# Patient Record
Sex: FEMALE | Race: WHITE | NOT HISPANIC OR LATINO | Employment: OTHER | URBAN - NONMETROPOLITAN AREA
[De-identification: names, ages, dates, MRNs, and addresses within clinical notes are randomized per-mention and may not be internally consistent; named-entity substitution may affect disease eponyms.]

---

## 2017-02-08 ENCOUNTER — OFFICE VISIT (OUTPATIENT)
Dept: OTOLARYNGOLOGY | Facility: CLINIC | Age: 77
End: 2017-02-08

## 2017-02-08 VITALS
DIASTOLIC BLOOD PRESSURE: 64 MMHG | SYSTOLIC BLOOD PRESSURE: 137 MMHG | WEIGHT: 169 LBS | TEMPERATURE: 97.8 F | RESPIRATION RATE: 20 BRPM | HEIGHT: 64 IN | HEART RATE: 51 BPM | BODY MASS INDEX: 28.85 KG/M2

## 2017-02-08 DIAGNOSIS — C44.319 BASAL CELL CARCINOMA OF RIGHT CHEEK: ICD-10-CM

## 2017-02-08 DIAGNOSIS — H53.40 VISUAL FIELD DEFECTS: ICD-10-CM

## 2017-02-08 DIAGNOSIS — D03.39 MELANOMA IN SITU OF CHEEK (HCC): Primary | ICD-10-CM

## 2017-02-08 DIAGNOSIS — H02.30 PSEUDOPTOSIS, UNSPECIFIED LATERALITY: ICD-10-CM

## 2017-02-08 PROCEDURE — 99213 OFFICE O/P EST LOW 20 MIN: CPT | Performed by: OTOLARYNGOLOGY

## 2017-02-08 NOTE — PROGRESS NOTES
Skin Cancer Follow-up    Mary Cleveland presents for a cancer surveillance and skin check following excision of a pigmented lesion of the right cheek with linear closure on 11/1/16.     Subjective: Since surgery, she is doing well without complaints. Symptoms denied postoperatively include fever, chills, bleeding and drainage. The patient states the pain has ceased since surgery.     Objective:   Pathology review: Pathology was reviewed previously. 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 This is not an oriented specimen. There was also an incidental basal cell carcinoma which was completely excised.     She is also s/p upper blepharoplasty on 11/1/16. She has not had an improvement in visual fields - she is awaiting cataract surgery. She denies dry eyes, milia formation, visual change, or eye irritation.               Past Medical History   Diagnosis Date   • Brow ptosis    • Dermatochalasia    • Hypertension    • Melanoma      right cheek on face   • Pseudoptosis    • Ptosis of eyelid    • Visual field defect      Past Surgical History   Procedure Laterality Date   • Cholecystectomy     • Colon surgery       RESECTION   • Hysterectomy       TOTAL   • Blepharoplasty Bilateral 11/1/2016     Procedure: Bilateral upper blepharoplasty with excision of excessive tissue weighing eyelids down AND excisional biopsy of right cheek lesion with permanent section analysis and linear closure.;  Surgeon: Andriy Sorenson MD;  Location: [facility] OR;  Service:    • Skin lesion excision     • Blepharoplasty     • Breast biopsy     • Oophorectomy       Family History   Problem Relation Age of Onset   • Cancer Mother    • Cancer Father      Social History   Substance Use Topics   • Smoking status: Former Smoker     Years: 41.00     Quit date: 2004   • Smokeless tobacco: Never Used   • Alcohol use No     Allergies:  Sulfa antibiotics    Current Outpatient Prescriptions:   •  busPIRone  "(BUSPAR) 15 MG tablet, Take 0.5 tablets by mouth 2 (Two) Times a Day., Disp: 60 tablet, Rfl: 2  •  diphenoxylate-atropine (LOMOTIL) 2.5-0.025 MG per tablet, 4 (Four) Times a Day As Needed., Disp: , Rfl:   •  hydrochlorothiazide (MICROZIDE) 12.5 MG capsule, Take 12.5 mg by mouth Every Morning., Disp: , Rfl:   •  metoprolol tartrate (LOPRESSOR) 50 MG tablet, 1 tab daily, Disp: , Rfl:   •  traMADol (ULTRAM) 50 MG tablet, Every 6 (Six) Hours As Needed., Disp: , Rfl:     Review of Systems   Constitutional: Positive for appetite change. Negative for activity change, chills, fatigue, fever and unexpected weight change.   HENT: Negative for congestion, dental problem, facial swelling and nosebleeds.    Eyes: Negative for discharge and redness.   Skin: Negative for color change, pallor and rash.   Hematological: Negative for adenopathy. Does not bruise/bleed easily.   Psychiatric/Behavioral: The patient is nervous/anxious.           Visit Vitals   • /64   • Pulse 51   • Temp 97.8 °F (36.6 °C)   • Resp 20   • Ht 64\" (162.6 cm)   • Wt 169 lb (76.7 kg)   • BMI 29.01 kg/m2       Physical Exam   Constitutional: She is oriented to person, place, and time. She appears well-developed and well-nourished. She is cooperative. No distress.   HENT:   Head: Normocephalic and atraumatic.   Right Ear: External ear normal.   Left Ear: External ear normal.   Nose: Nose normal.   Mouth/Throat: Oropharynx is clear and moist.   Eyes: Conjunctivae, EOM and lids are normal. Right eye exhibits no discharge. Left eye exhibits no discharge. No scleral icterus.   Neck: Normal range of motion and phonation normal. Neck supple. No tracheal deviation present.   Pulmonary/Chest: Effort normal. No stridor. No respiratory distress.   Musculoskeletal: Normal range of motion. She exhibits no edema or deformity.   Lymphadenopathy:        Head (right side): No preauricular, no posterior auricular and no occipital adenopathy present.        Head (left " side): No preauricular, no posterior auricular and no occipital adenopathy present.     She has no cervical adenopathy.   Neurological: She is alert and oriented to person, place, and time. She has normal strength. No cranial nerve deficit. Coordination normal.   Skin: Skin is warm and dry. No rash noted. She is not diaphoretic. No erythema. No pallor.   Right cheek incision well healed- no evidence of recurrence.    Psychiatric: She has a normal mood and affect. Her speech is normal and behavior is normal. Judgment and thought content normal. Cognition and memory are normal.   Nursing note and vitals reviewed.      Assessment:  Mary was seen today for follow-up.    Diagnoses and all orders for this visit:    Melanoma in situ of cheek    Basal cell carcinoma of right cheek    Pseudoptosis, unspecified laterality    Visual field defects        Plan:  Discussed pathology results- discussed continued observation vs excision. Patient opted for continued observation. She is seeing Dr. Franco in 2 months. Follow-up with me in 5 months.     Protect the incisions from sunlight. Sunlight to the incisions will cause permanent pigmentation to the incision line and make the incision more noticeable. After the incision has reepithelialized, you may begin to use sunscreen with an SPF of 15 or greater    I discussed the use of  Vitamin E, Mederma, or a high-quality extra virgin olive oil to the incisions to optimize the end result. Apply topically twice daily, or as directed, to help optimize wound healing and decrease erythema.    Discussed the importance of routine skin checks with a dermatologist every 6-12 months.     Return in about 5 months (around 7/8/2017).      Andriy Sorenson MD   02/08/17  1:17 PM

## 2017-02-08 NOTE — PROGRESS NOTES
Mary Cleveland presents  She is status post bilateral upper blepharoplasty on 11/01/2016.  I also performed an excisional biopsy of a changing pigmented lesion of the right cheek.      Subjective: Following surgery, the patient has reported improvement in visual fields subjectively. The patient denies dry eyes, milia formation, visual change and eye irritation.    Pathology review: Right cheek demonstrates a melanoma in situ, 1.7 mm the closest peripheral margin.  This is not an oriented specimen.  There is also an incidental basal cell carcinoma which was completely excised.    Objective:     Physical Exam   Constitutional: She is oriented to person, place, and time. She appears well-developed and well-nourished. She is cooperative. No distress.   HENT:   Head: Normocephalic.   Right Ear: External ear normal.   Left Ear: External ear normal.   Nose: Nose normal.   Mouth/Throat: Oropharynx is clear and moist.   Eyes: Right eye exhibits no discharge. Left eye exhibits no discharge. No scleral icterus.   Healing well.  Sutures removed.  Moderate ecchymosis   Neck: Normal range of motion. Neck supple. No JVD present. No tracheal deviation present. No thyromegaly present.   Pulmonary/Chest: Effort normal. No stridor.   Musculoskeletal: Normal range of motion. She exhibits no edema or deformity.   Lymphadenopathy:        Head (right side): No preauricular, no posterior auricular and no occipital adenopathy present.        Head (left side): No preauricular, no posterior auricular and no occipital adenopathy present.     She has no cervical adenopathy.   Neurological: She is alert and oriented to person, place, and time. She has normal strength. No cranial nerve deficit. Coordination normal.   Skin: Skin is warm and dry. No rash noted. She is not diaphoretic. No erythema. No pallor.   Right cheek incision healing well.  No evidence of pigmentation around the incision.  Sutures were removed.   Psychiatric: She has a normal  mood and affect. Her speech is normal and behavior is normal. Judgment and thought content normal. Cognition and memory are normal.   Nursing note and vitals reviewed.          Assessment:   There are no diagnoses linked to this encounter.  Melanoma: [id number]: fully excised 11/1/2016    Plan:  There are no Patient Instructions on file for this visit.    Return in about 5 months (around 7/8/2017).

## 2017-05-22 ENCOUNTER — TRANSCRIBE ORDERS (OUTPATIENT)
Dept: ADMINISTRATIVE | Facility: HOSPITAL | Age: 77
End: 2017-05-22

## 2017-05-22 DIAGNOSIS — Z78.0 MENOPAUSE: Primary | ICD-10-CM

## 2017-06-07 ENCOUNTER — HOSPITAL ENCOUNTER (OUTPATIENT)
Dept: BONE DENSITY | Facility: HOSPITAL | Age: 77
Discharge: HOME | End: 2017-06-07
Attending: FAMILY MEDICINE | Admitting: FAMILY MEDICINE

## 2017-06-07 DIAGNOSIS — Z78.0 MENOPAUSE: ICD-10-CM

## 2017-06-07 PROCEDURE — 77080 DXA BONE DENSITY AXIAL: CPT

## 2017-07-05 ENCOUNTER — OFFICE VISIT (OUTPATIENT)
Dept: OTOLARYNGOLOGY | Facility: CLINIC | Age: 77
End: 2017-07-05

## 2017-07-05 VITALS
RESPIRATION RATE: 20 BRPM | DIASTOLIC BLOOD PRESSURE: 75 MMHG | HEART RATE: 63 BPM | WEIGHT: 169 LBS | SYSTOLIC BLOOD PRESSURE: 148 MMHG | HEIGHT: 64 IN | TEMPERATURE: 97.8 F | BODY MASS INDEX: 28.85 KG/M2

## 2017-07-05 DIAGNOSIS — D03.39 MELANOMA IN SITU OF CHEEK (HCC): Primary | ICD-10-CM

## 2017-07-05 DIAGNOSIS — C44.319 BASAL CELL CARCINOMA OF RIGHT CHEEK: ICD-10-CM

## 2017-07-05 DIAGNOSIS — L81.4 SOLAR LENTIGO: ICD-10-CM

## 2017-07-05 PROCEDURE — 99213 OFFICE O/P EST LOW 20 MIN: CPT | Performed by: OTOLARYNGOLOGY

## 2017-07-05 NOTE — PROGRESS NOTES
Skin Cancer Follow-up    Mary Cleveland presents for a cancer surveillance and skin check following excision of a pigmented lesion of the right cheek with linear closure on 11/1/16.     Subjective: Since surgery, she is doing well without complaints. Symptoms denied postoperatively include fever, chills, bleeding and drainage. The patient states the pain has ceased since surgery.     Objective:   Pathology review: Pathology was reviewed previously. 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 This is not an oriented specimen. There was also an incidental basal cell carcinoma which was completely excised.     Patient presents for follow-up general head and neck skin examination.  Patient has a history of basal cell carcinoma, malignant melanoma.  She has not noted recurrence.  The patient has noted a new lesion.     She also complains of a skin lesion of the right cheek. The lesion has been present for 1 month. Symptoms associated with the lesion are: pigmentation. Patient denies increasing diameter, increasing thickness, darkening color, itching, bleeding, pain, drainage, infection. Nothing makes the lesion better or worse. A biopsy has not been performed.     She is also s/p upper blepharoplasty on 11/1/16. She has had an improvement in visual fields after recent cataract surgery. She denies dry eyes, milia formation, visual change, or eye irritation.             Past Medical History:   Diagnosis Date   • Basal cell carcinoma of right cheek 2/8/2017   • Brow ptosis    • Dermatochalasia    • Hypertension    • Melanoma     right cheek on face   • Pseudoptosis    • Ptosis of eyelid    • Visual field defect      Past Surgical History:   Procedure Laterality Date   • BLEPHAROPLASTY Bilateral 11/1/2016    Procedure: Bilateral upper blepharoplasty with excision of excessive tissue weighing eyelids down AND excisional biopsy of right cheek lesion with permanent section  "analysis and linear closure.;  Surgeon: Andriy Sorenson MD;  Location: [facility] OR;  Service:    • BLEPHAROPLASTY     • BREAST BIOPSY     • CHOLECYSTECTOMY     • COLON SURGERY      RESECTION   • HYSTERECTOMY      TOTAL   • OOPHORECTOMY     • SKIN LESION EXCISION       Family History   Problem Relation Age of Onset   • Cancer Mother    • Cancer Father      Social History   Substance Use Topics   • Smoking status: Former Smoker     Years: 41.00     Quit date: 2004   • Smokeless tobacco: Never Used   • Alcohol use No     Allergies:  Sulfa antibiotics    Current Outpatient Prescriptions:   •  busPIRone (BUSPAR) 15 MG tablet, Take 0.5 tablets by mouth 2 (Two) Times a Day., Disp: 60 tablet, Rfl: 2  •  diphenoxylate-atropine (LOMOTIL) 2.5-0.025 MG per tablet, 4 (Four) Times a Day As Needed., Disp: , Rfl:   •  hydrochlorothiazide (MICROZIDE) 12.5 MG capsule, Take 12.5 mg by mouth Every Morning., Disp: , Rfl:   •  metoprolol tartrate (LOPRESSOR) 50 MG tablet, 1 tab daily, Disp: , Rfl:   •  traMADol (ULTRAM) 50 MG tablet, Every 6 (Six) Hours As Needed., Disp: , Rfl:     Review of Systems   Constitutional: Positive for appetite change. Negative for activity change, chills, fatigue, fever and unexpected weight change.   HENT: Negative for congestion, dental problem, facial swelling and nosebleeds.    Eyes: Negative for discharge and redness.   Skin: Negative for color change, pallor and rash.   Hematological: Negative for adenopathy. Does not bruise/bleed easily.   Psychiatric/Behavioral: The patient is nervous/anxious.           /75  Pulse 63  Temp 97.8 °F (36.6 °C)  Resp 20  Ht 64\" (162.6 cm)  Wt 169 lb (76.7 kg)  BMI 29.01 kg/m2    Physical Exam   Constitutional: She is oriented to person, place, and time. She appears well-developed and well-nourished. She is cooperative. No distress.   HENT:   Head: Normocephalic and atraumatic.   Right Ear: External ear normal.   Left Ear: External ear normal.   Nose: Nose " normal.   Mouth/Throat: Oropharynx is clear and moist.   Eyes: Conjunctivae, EOM and lids are normal. Right eye exhibits no discharge. Left eye exhibits no discharge. No scleral icterus.   Neck: Normal range of motion and phonation normal. Neck supple. No tracheal deviation present.   Pulmonary/Chest: Effort normal. No stridor. No respiratory distress.   Musculoskeletal: Normal range of motion. She exhibits no edema or deformity.   Lymphadenopathy:        Head (right side): No preauricular, no posterior auricular and no occipital adenopathy present.        Head (left side): No preauricular, no posterior auricular and no occipital adenopathy present.     She has no cervical adenopathy.   Neurological: She is alert and oriented to person, place, and time. She has normal strength. No cranial nerve deficit. Coordination normal.   Skin: Skin is warm and dry. No rash noted. She is not diaphoretic. No erythema. No pallor.   Incision well healed with minimal fibrosis. Right cheek with evenly hyperpigmented macule   Psychiatric: She has a normal mood and affect. Her speech is normal and behavior is normal. Judgment and thought content normal. Cognition and memory are normal.   Nursing note and vitals reviewed.              Assessment:  Mary was seen today for follow-up.    Diagnoses and all orders for this visit:    Melanoma in situ of cheek    Basal cell carcinoma of right cheek    Solar lentigo    s/p blepharoplasty    Plan:  Discussed skin lesion on right cheek- this appears to be benign. Discussed continued observation vs biopsy. Patient opted for observation. She was instructed to call or return to the office if the lesion enlarges, darkens, or changes.     Protect the incisions from sunlight. Sunlight to the incisions will cause permanent pigmentation to the incision line and make the incision more noticeable. After the incision has reepithelialized, you may begin to use sunscreen with an SPF of 15 or greater    I  discussed the use of  Vitamin E, Mederma, or a high-quality extra virgin olive oil to the incisions to optimize the end result. Apply topically twice daily, or as directed, to help optimize wound healing and decrease erythema.    Discussed the importance of routine skin checks with a dermatologist every 6-12 months. She sees Dr. Franco regularly     Return in about 6 months (around 1/5/2018), or if symptoms worsen or fail to improve, for Recheck.      Andriy Sorenson MD   07/05/17  10:33 AM

## 2017-08-01 ENCOUNTER — OFFICE VISIT (OUTPATIENT)
Dept: OTOLARYNGOLOGY | Facility: CLINIC | Age: 77
End: 2017-08-01

## 2017-08-01 VITALS
TEMPERATURE: 97.8 F | DIASTOLIC BLOOD PRESSURE: 68 MMHG | RESPIRATION RATE: 20 BRPM | HEART RATE: 65 BPM | WEIGHT: 178 LBS | SYSTOLIC BLOOD PRESSURE: 148 MMHG | BODY MASS INDEX: 30.39 KG/M2 | HEIGHT: 64 IN

## 2017-08-01 DIAGNOSIS — D03.39 MELANOMA IN SITU OF CHEEK (HCC): ICD-10-CM

## 2017-08-01 DIAGNOSIS — D48.9 NEOPLASM OF UNCERTAIN BEHAVIOR: ICD-10-CM

## 2017-08-01 DIAGNOSIS — C44.319 BASAL CELL CARCINOMA OF RIGHT CHEEK: Primary | ICD-10-CM

## 2017-08-01 PROCEDURE — 99213 OFFICE O/P EST LOW 20 MIN: CPT | Performed by: NURSE PRACTITIONER

## 2017-08-01 PROCEDURE — 88305 TISSUE EXAM BY PATHOLOGIST: CPT | Performed by: NURSE PRACTITIONER

## 2017-08-01 PROCEDURE — 11100 PR BIOPSY OF SKIN LESION: CPT | Performed by: NURSE PRACTITIONER

## 2017-08-01 NOTE — PROGRESS NOTES
Skin Cancer Follow-Up    Mary Cleveland presents for a cancer surveillance and skin check following excision of a pigmented lesion of the right cheek with linear closure on 11/1/16.     Subjective: Since surgery, she is doing well without complaints. Symptoms denied postoperatively include fever, chills, bleeding and drainage. The patient states the pain has ceased since surgery.     Objective:   Pathology review: Pathology was reviewed previously. 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 This is not an oriented specimen. There was also an incidental basal cell carcinoma which was completely excised.     Patient presents for follow-up general head and neck skin examination.  Patient has a history of basal cell carcinoma, malignant melanoma.  She has not noted recurrence.  The patient has noted a new lesion.     She also complains of a skin lesion of the right cheek. The lesion has been present for 2 months. Symptoms associated with the lesion are: darkening in color. Patient denies increasing diameter, increasing thickness, darkening color, itching, bleeding, pain, drainage, infection. Nothing makes the lesion better or worse. A biopsy has not been performed.     She is also s/p upper blepharoplasty on 11/1/16. She has had an improvement in visual fields after recent cataract surgery. She denies dry eyes, milia formation, visual change, or eye irritation.             Past Medical History:   Diagnosis Date   • Basal cell carcinoma of right cheek 2/8/2017   • Brow ptosis    • Dermatochalasia    • Hypertension    • Melanoma     right cheek on face   • Pseudoptosis    • Ptosis of eyelid    • Visual field defect      Past Surgical History:   Procedure Laterality Date   • BLEPHAROPLASTY Bilateral 11/1/2016    Procedure: Bilateral upper blepharoplasty with excision of excessive tissue weighing eyelids down AND excisional biopsy of right cheek lesion with permanent  "section analysis and linear closure.;  Surgeon: Andriy Sorenson MD;  Location: [facility] OR;  Service:    • BLEPHAROPLASTY     • BREAST BIOPSY     • CHOLECYSTECTOMY     • COLON SURGERY      RESECTION   • HYSTERECTOMY      TOTAL   • OOPHORECTOMY     • SKIN LESION EXCISION       Family History   Problem Relation Age of Onset   • Cancer Mother    • Cancer Father      Social History   Substance Use Topics   • Smoking status: Former Smoker     Years: 41.00     Quit date: 2004   • Smokeless tobacco: Never Used   • Alcohol use No     Allergies:  Sulfa antibiotics    Current Outpatient Prescriptions:   •  busPIRone (BUSPAR) 15 MG tablet, Take 0.5 tablets by mouth 2 (Two) Times a Day., Disp: 60 tablet, Rfl: 2  •  diphenoxylate-atropine (LOMOTIL) 2.5-0.025 MG per tablet, 4 (Four) Times a Day As Needed., Disp: , Rfl:   •  hydrochlorothiazide (MICROZIDE) 12.5 MG capsule, Take 12.5 mg by mouth Every Morning., Disp: , Rfl:   •  metoprolol tartrate (LOPRESSOR) 50 MG tablet, 1 tab daily, Disp: , Rfl:   •  traMADol (ULTRAM) 50 MG tablet, Every 6 (Six) Hours As Needed., Disp: , Rfl:     Review of Systems   Constitutional: Negative for activity change, chills, fatigue, fever and unexpected weight change.   HENT: Negative for congestion, dental problem, facial swelling and nosebleeds.    Eyes: Negative for discharge and redness.   Skin: Negative for color change, pallor and rash.   Hematological: Negative for adenopathy. Does not bruise/bleed easily.   Psychiatric/Behavioral: The patient is nervous/anxious.           /68  Pulse 65  Temp 97.8 °F (36.6 °C)  Resp 20  Ht 64\" (162.6 cm)  Wt 178 lb (80.7 kg)  BMI 30.55 kg/m2    Physical Exam   Constitutional: She is oriented to person, place, and time. She appears well-developed and well-nourished. She is cooperative. No distress.   HENT:   Head: Normocephalic and atraumatic.   Right Ear: External ear normal.   Left Ear: External ear normal.   Nose: Nose normal.   Mouth/Throat: " Oropharynx is clear and moist.   Eyes: Conjunctivae, EOM and lids are normal. Right eye exhibits no discharge. Left eye exhibits no discharge. No scleral icterus.   Neck: Normal range of motion and phonation normal. Neck supple. No tracheal deviation present.   Pulmonary/Chest: Effort normal. No stridor. No respiratory distress.   Musculoskeletal: Normal range of motion. She exhibits no deformity.   Lymphadenopathy:        Head (right side): No preauricular, no posterior auricular and no occipital adenopathy present.        Head (left side): No preauricular, no posterior auricular and no occipital adenopathy present.     She has no cervical adenopathy.   Neurological: She is alert and oriented to person, place, and time. She has normal strength. No cranial nerve deficit. Coordination normal.   Skin: Skin is warm and dry. No rash noted. She is not diaphoretic. No erythema. No pallor.   Incision well healed with minimal fibrosis. Right cheek with evenly hyperpigmented macule   Psychiatric: She has a normal mood and affect. Her speech is normal and behavior is normal. Judgment and thought content normal. Cognition and memory are normal.   Nursing note and vitals reviewed.            Assessment:  Mary was seen today for skin lesion.    Diagnoses and all orders for this visit:    Basal cell carcinoma of right cheek    Melanoma in situ of cheek    Neoplasm of uncertain behavior  -     Tissue Pathology Exam      S/p blepharoplasty    Plan:  Discussed skin lesion on right cheek. Discussed continued observation vs biopsy. Patient requests biopsy today because she thinks this area is darkening. She remains very anxious about this. 2mm punch biopsy today-see procedure note. Follow-up in 1-2 weeks to discuss pathology results.     Protect the incisions from sunlight. Sunlight to the incisions will cause permanent pigmentation to the incision line and make the incision more noticeable. After the incision has reepithelialized,  you may begin to use sunscreen with an SPF of 15 or greater    I discussed the use of  Vitamin E, Mederma, or a high-quality extra virgin olive oil to the incisions to optimize the end result. Apply topically twice daily, or as directed, to help optimize wound healing and decrease erythema.    Discussed the importance of routine skin checks with a dermatologist every 6-12 months. She sees Dr. Franco regularly     Return in about 2 weeks (around 8/15/2017), or if symptoms worsen or fail to improve, for Recheck.      Lucero Love, NEHA   08/01/17  2:36 PM

## 2017-08-01 NOTE — PROGRESS NOTES
Procedure   Procedures    Preprocedure diagnosis: A changing lesion of the right cheek    Post procedure diagnosis: Same    Procedure: Punch biopsy    Details:  Patient consent was obtained.  The skin was cleansed with alcohol.  The skin was infiltrated with 1 mL of 1% lidocaine with 1-100,000 epinephrine.  After approximately 10 minutes, a 2 millimeter punch biopsy was taken by placing circular motion on the biopsy tool, the skin was elevated and clipped with iris scissors.  The specimen was sent in formalin.  The skin was closed using a simple 5-0 fast absorbing gut suture.  Antibiotic ointment was placed over the biopsy site.  The patient tolerated the procedure with minimal discomfort.    Lucero Love, NEHA  08/01/17  2:36 PM

## 2017-08-03 LAB
CYTO UR: NORMAL
LAB AP CASE REPORT: NORMAL
Lab: NORMAL
PATH REPORT.FINAL DX SPEC: NORMAL
PATH REPORT.GROSS SPEC: NORMAL

## 2017-08-16 ENCOUNTER — OFFICE VISIT (OUTPATIENT)
Dept: OTOLARYNGOLOGY | Facility: CLINIC | Age: 77
End: 2017-08-16

## 2017-08-16 ENCOUNTER — TELEPHONE (OUTPATIENT)
Dept: OTOLARYNGOLOGY | Facility: CLINIC | Age: 77
End: 2017-08-16

## 2017-08-16 VITALS
HEIGHT: 64 IN | BODY MASS INDEX: 30.39 KG/M2 | HEART RATE: 67 BPM | RESPIRATION RATE: 20 BRPM | SYSTOLIC BLOOD PRESSURE: 138 MMHG | TEMPERATURE: 98 F | DIASTOLIC BLOOD PRESSURE: 79 MMHG | WEIGHT: 178 LBS

## 2017-08-16 DIAGNOSIS — D03.39 MELANOMA IN SITU OF CHEEK (HCC): ICD-10-CM

## 2017-08-16 DIAGNOSIS — C44.319 BASAL CELL CARCINOMA OF RIGHT CHEEK: Primary | ICD-10-CM

## 2017-08-16 DIAGNOSIS — L81.4 SOLAR LENTIGO: ICD-10-CM

## 2017-08-16 PROCEDURE — 99213 OFFICE O/P EST LOW 20 MIN: CPT | Performed by: OTOLARYNGOLOGY

## 2017-08-16 NOTE — PROGRESS NOTES
Skin Biopsy Follow-Up    Mrs. Cleveland is here to follow up from complaints of a skin lesion of the right cheek. The lesion has been present for 2 months. Symptoms associated with the lesion are: darkening in color. Patient denies increasing diameter, increasing thickness, darkening color, itching, bleeding, pain, drainage, infection. Nothing makes the lesion better or worse. A biopsy has been performed revealing solar elastosis with increased basilar layer pigmentation consistent with benign solar lentigo.      She also presents for follow-up general head and neck skin examination.  Patient has a history of basal cell carcinoma, malignant melanoma.  She has not noted recurrence.  The patient has not noted a new lesion. 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 This is not an oriented specimen. There was also an incidental basal cell carcinoma which was completely excised.     She is also s/p upper blepharoplasty on 11/1/16. She has had an improvement in visual fields after recent cataract surgery. She denies dry eyes, milia formation, visual change, or eye irritation.           Past Medical History:   Diagnosis Date   • Basal cell carcinoma of right cheek 2/8/2017   • Brow ptosis    • Dermatochalasia    • Hypertension    • Melanoma     right cheek on face   • Pseudoptosis    • Ptosis of eyelid    • Solar lentigo     Right Cheek   • Visual field defect      Past Surgical History:   Procedure Laterality Date   • BLEPHAROPLASTY Bilateral 11/1/2016    Procedure: Bilateral upper blepharoplasty with excision of excessive tissue weighing eyelids down AND excisional biopsy of right cheek lesion with permanent section analysis and linear closure.;  Surgeon: Andriy Sorenson MD;  Location: [facility] OR;  Service:    • BLEPHAROPLASTY     • BREAST BIOPSY     •  "CHOLECYSTECTOMY     • COLON SURGERY      RESECTION   • HYSTERECTOMY      TOTAL   • OOPHORECTOMY     • SKIN LESION EXCISION       Family History   Problem Relation Age of Onset   • Cancer Mother    • Cancer Father      Social History   Substance Use Topics   • Smoking status: Former Smoker     Years: 41.00     Quit date: 2004   • Smokeless tobacco: Never Used   • Alcohol use No     Allergies:  Sulfa antibiotics    Current Outpatient Prescriptions:   •  busPIRone (BUSPAR) 15 MG tablet, Take 0.5 tablets by mouth 2 (Two) Times a Day., Disp: 60 tablet, Rfl: 2  •  diphenoxylate-atropine (LOMOTIL) 2.5-0.025 MG per tablet, 4 (Four) Times a Day As Needed., Disp: , Rfl:   •  hydrochlorothiazide (MICROZIDE) 12.5 MG capsule, Take 12.5 mg by mouth Every Morning., Disp: , Rfl:   •  metoprolol tartrate (LOPRESSOR) 50 MG tablet, 1 tab daily, Disp: , Rfl:   •  traMADol (ULTRAM) 50 MG tablet, Every 6 (Six) Hours As Needed., Disp: , Rfl:     Review of Systems   Constitutional: Negative for activity change, chills, fatigue, fever and unexpected weight change.   HENT: Negative for congestion, dental problem, facial swelling and nosebleeds.    Eyes: Negative for discharge and redness.   Skin: Negative for color change, pallor and rash.   Hematological: Negative for adenopathy. Does not bruise/bleed easily.   Psychiatric/Behavioral: The patient is nervous/anxious.           /79  Pulse 67  Temp 98 °F (36.7 °C)  Resp 20  Ht 64\" (162.6 cm)  Wt 178 lb (80.7 kg)  BMI 30.55 kg/m2    Physical Exam   Constitutional: She is oriented to person, place, and time. She appears well-developed and well-nourished. She is cooperative. No distress.   HENT:   Head: Normocephalic and atraumatic.   Right Ear: External ear normal.   Left Ear: External ear normal.   Nose: Nose normal. No nasal deformity.   Mouth/Throat: Oropharynx is clear and moist.   Eyes: Conjunctivae, EOM and lids are normal. Right eye exhibits no discharge. Left eye exhibits no " discharge. No scleral icterus.   Neck: Normal range of motion and phonation normal. Neck supple. No tracheal deviation present.   Pulmonary/Chest: Effort normal. No stridor. No respiratory distress.   Musculoskeletal: Normal range of motion. She exhibits no deformity.   Lymphadenopathy:        Head (right side): No preauricular, no posterior auricular and no occipital adenopathy present.        Head (left side): No preauricular, no posterior auricular and no occipital adenopathy present.     She has no cervical adenopathy.   Neurological: She is alert and oriented to person, place, and time. She has normal strength. No cranial nerve deficit. Coordination normal.   Skin: Skin is warm and dry. No rash noted. She is not diaphoretic. No erythema. No pallor.   Incision and biopsy site are well healed without evidence of recurrence   Psychiatric: She has a normal mood and affect. Her speech is normal and behavior is normal. Judgment and thought content normal. Cognition and memory are normal.   Nursing note and vitals reviewed.            Assessment:  Mary was seen today for follow-up.    Diagnoses and all orders for this visit:    Basal cell carcinoma of right cheek    Melanoma in situ of cheek    Solar lentigo    S/p blepharoplasty    Plan:  Discussed pathology results- benign. No evidence of recurrence.     Protect the incisions from sunlight. Sunlight to the incisions will cause permanent pigmentation to the incision line and make the incision more noticeable. After the incision has reepithelialized, you may begin to use sunscreen with an SPF of 15 or greater    I discussed the use of  Vitamin E, Mederma, or a high-quality extra virgin olive oil to the incisions to optimize the end result. Apply topically twice daily, or as directed, to help optimize wound healing and decrease erythema.    Discussed the importance of routine skin checks with a dermatologist every 6-12 months. She sees Dr. Franco in May so I will see  her in January.     Return in about 5 months (around 1/16/2018) for in January.      Andriy Sorensno MD   08/16/17  1:45 PM

## 2017-08-16 NOTE — TELEPHONE ENCOUNTER
"As patient was leaving office today-she stopped at  and said she needed to take 3 medications off her med list- States \"she forgot to tell the nurse\". States she is no longer taking Tramadol, Buspar, or Lomotil  "

## 2017-10-27 ENCOUNTER — TELEPHONE (OUTPATIENT)
Dept: OBSTETRICS AND GYNECOLOGY | Facility: CLINIC | Age: 77
End: 2017-10-27

## 2017-10-27 DIAGNOSIS — Z12.31 VISIT FOR SCREENING MAMMOGRAM: Primary | ICD-10-CM

## 2018-01-08 ENCOUNTER — OFFICE VISIT (OUTPATIENT)
Dept: OTOLARYNGOLOGY | Facility: CLINIC | Age: 78
End: 2018-01-08

## 2018-01-08 VITALS
DIASTOLIC BLOOD PRESSURE: 78 MMHG | TEMPERATURE: 97.8 F | SYSTOLIC BLOOD PRESSURE: 132 MMHG | RESPIRATION RATE: 20 BRPM | HEART RATE: 80 BPM | BODY MASS INDEX: 28 KG/M2 | WEIGHT: 164 LBS | HEIGHT: 64 IN

## 2018-01-08 DIAGNOSIS — H02.30 PSEUDOPTOSIS, UNSPECIFIED LATERALITY: ICD-10-CM

## 2018-01-08 DIAGNOSIS — L81.4 SOLAR LENTIGO: ICD-10-CM

## 2018-01-08 DIAGNOSIS — C44.319 BASAL CELL CARCINOMA OF RIGHT CHEEK: Primary | ICD-10-CM

## 2018-01-08 DIAGNOSIS — D03.39 MELANOMA IN SITU OF CHEEK (HCC): ICD-10-CM

## 2018-01-08 PROCEDURE — 99213 OFFICE O/P EST LOW 20 MIN: CPT | Performed by: OTOLARYNGOLOGY

## 2018-01-08 NOTE — PROGRESS NOTES
Melanoma Follow-Up    Mrs. Cleveland presents for follow-up general head and neck skin examination.  Patient has a history of basal cell carcinoma and a malignant melanoma.  She has not noted recurrence.  The patient has noted two very small, asymptomatic lesion of the right crows foot area.  These are not changing.  They have been present for months to years.  No bleeding.  No lymph node enlargement.    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 This was not an oriented specimen. There was also an incidental basal cell carcinoma which was completely excised.     She is also s/p upper blepharoplasty on 11/1/16. She has had an improvement in visual fields after recent cataract surgery. She denies dry eyes, milia formation, visual change, or eye irritation.           Past Medical History:   Diagnosis Date   • Basal cell carcinoma of right cheek 2/8/2017   • Brow ptosis    • Dermatochalasia    • Hypertension    • Melanoma in situ of cheek     Right Cheek   • Pseudoptosis    • Ptosis of eyelid    • Solar lentigo     Right Cheek   • Visual field defect      Past Surgical History:   Procedure Laterality Date   • BLEPHAROPLASTY Bilateral 11/1/2016    Procedure: Bilateral upper blepharoplasty with excision of excessive tissue weighing eyelids down AND excisional biopsy of right cheek lesion with permanent section analysis and linear closure.;  Surgeon: Andriy Sorenson MD;  Location: [facility] OR;  Service:    • BLEPHAROPLASTY     • BREAST BIOPSY     • CHOLECYSTECTOMY     • COLON SURGERY      RESECTION   • HYSTERECTOMY      TOTAL   • OOPHORECTOMY     • SKIN LESION EXCISION       Family History   Problem Relation Age of Onset   • Cancer Mother    • Cancer Father      Social History   Substance Use Topics   • Smoking status: Former Smoker     Years: 41.00     Quit date:  "2004   • Smokeless tobacco: Never Used   • Alcohol use No     Allergies:  Sulfa antibiotics    Current Outpatient Prescriptions:   •  hydrochlorothiazide (MICROZIDE) 12.5 MG capsule, Take 12.5 mg by mouth Every Morning., Disp: , Rfl:   •  metoprolol tartrate (LOPRESSOR) 50 MG tablet, 1 tab daily, Disp: , Rfl:     Review of Systems   Constitutional: Negative for activity change, chills, fatigue, fever and unexpected weight change.   HENT: Negative for congestion, dental problem, facial swelling and nosebleeds.    Eyes: Negative for discharge and redness.   Skin: Negative for color change, pallor and rash.   Hematological: Negative for adenopathy. Does not bruise/bleed easily.   Psychiatric/Behavioral: The patient is nervous/anxious.           /78  Pulse 80  Temp 97.8 °F (36.6 °C)  Resp 20  Ht 162.6 cm (64\")  Wt 74.4 kg (164 lb)  BMI 28.15 kg/m2    Physical Exam   Constitutional: She is oriented to person, place, and time. She appears well-developed and well-nourished. She is cooperative. No distress.   HENT:   Head: Normocephalic and atraumatic.   Right Ear: External ear normal.   Left Ear: External ear normal.   Nose: Nose normal. No nasal deformity.   Mouth/Throat: Oropharynx is clear and moist.   Eyes: Conjunctivae, EOM and lids are normal. Right eye exhibits no discharge. Left eye exhibits no discharge. No scleral icterus.   Neck: Normal range of motion and phonation normal. Neck supple. No tracheal deviation present.   Pulmonary/Chest: Effort normal. No stridor. No respiratory distress.   Musculoskeletal: Normal range of motion. She exhibits no deformity.   Lymphadenopathy:        Head (right side): No preauricular, no posterior auricular and no occipital adenopathy present.        Head (left side): No preauricular, no posterior auricular and no occipital adenopathy present.     She has no cervical adenopathy.   Neurological: She is alert and oriented to person, place, and time. She has normal " strength. No cranial nerve deficit. Coordination normal.   Skin: Skin is warm and dry. No rash noted. She is not diaphoretic. No erythema. No pallor.   Incision and biopsy site are well healed without evidence of recurrence   Psychiatric: She has a normal mood and affect. Her speech is normal and behavior is normal. Judgment and thought content normal. Cognition and memory are normal.   Nursing note and vitals reviewed.    Assessment:  Mary was seen today for follow-up.    Diagnoses and all orders for this visit:    Basal cell carcinoma of right cheek    Melanoma in situ of cheek    Solar lentigo    Pseudoptosis, unspecified laterality       S/p blepharoplasty    Plan:    No evidence of recurrent disease today.  Staging is [id number] s/p complete excision 11/1/2016     Protect the incisions from sunlight. Sunlight to the incisions will cause permanent pigmentation to the incision line and make the incision more noticeable. After the incision has reepithelialized, you may begin to use sunscreen with an SPF of 15 or greater    I discussed the use of  Vitamin E, Mederma, or a high-quality extra virgin olive oil to the incisions to optimize the end result. Apply topically twice daily, or as directed, to help optimize wound healing and decrease erythema.    Discussed the importance of routine skin checks with a dermatologist every 6-12 months. F/U with me in 6 months.  If no evidence of recurrent disease, may release back to Dr. Franco for skin checks.    Return in about 6 months (around 7/8/2018).      Andriy Sorenson MD   01/08/18  12:05 PM

## 2018-05-15 ENCOUNTER — HOSPITAL ENCOUNTER (OUTPATIENT)
Dept: GENERAL RADIOLOGY | Age: 78
Discharge: HOME | End: 2018-05-15
Payer: MEDICARE

## 2018-05-15 DIAGNOSIS — M54.2 CERVICALGIA: ICD-10-CM

## 2018-05-15 PROCEDURE — 72040 X-RAY EXAM NECK SPINE 2-3 VW: CPT

## 2018-08-13 ENCOUNTER — OFFICE VISIT (OUTPATIENT)
Dept: OTOLARYNGOLOGY | Facility: CLINIC | Age: 78
End: 2018-08-13

## 2018-08-13 VITALS
HEART RATE: 65 BPM | WEIGHT: 170 LBS | TEMPERATURE: 98 F | HEIGHT: 64 IN | SYSTOLIC BLOOD PRESSURE: 134 MMHG | RESPIRATION RATE: 20 BRPM | BODY MASS INDEX: 29.02 KG/M2 | DIASTOLIC BLOOD PRESSURE: 70 MMHG

## 2018-08-13 DIAGNOSIS — D03.39 MELANOMA IN SITU OF CHEEK (HCC): ICD-10-CM

## 2018-08-13 DIAGNOSIS — C44.319 BASAL CELL CARCINOMA OF RIGHT CHEEK: Primary | ICD-10-CM

## 2018-08-13 DIAGNOSIS — D48.9 NEOPLASM OF UNCERTAIN BEHAVIOR: ICD-10-CM

## 2018-08-13 DIAGNOSIS — L81.4 SOLAR LENTIGO: ICD-10-CM

## 2018-08-13 PROCEDURE — 88341 IMHCHEM/IMCYTCHM EA ADD ANTB: CPT | Performed by: OTOLARYNGOLOGY

## 2018-08-13 PROCEDURE — 88305 TISSUE EXAM BY PATHOLOGIST: CPT | Performed by: OTOLARYNGOLOGY

## 2018-08-13 PROCEDURE — 99213 OFFICE O/P EST LOW 20 MIN: CPT | Performed by: OTOLARYNGOLOGY

## 2018-08-13 PROCEDURE — 11100 PR BIOPSY OF SKIN LESION: CPT | Performed by: OTOLARYNGOLOGY

## 2018-08-13 PROCEDURE — 88342 IMHCHEM/IMCYTCHM 1ST ANTB: CPT | Performed by: OTOLARYNGOLOGY

## 2018-08-13 NOTE — PROGRESS NOTES
Chief Complaint   Patient presents with   • Follow-up     1) BCC/ MELANOMA  2) New lesion on right cheek       Melanoma Follow-Up    Mrs. Cleveland presents for follow-up general head and neck skin examination.  Patient has a history of basal cell carcinoma and a malignant melanoma.  She has not noted recurrence.  She has noted some darkening of the skin of the right supero-lateral cheek.  This started sometime after May.  No bleeding.  No biopsy has been performed.  She sees Dr. Franco.  She did not have this lesion when she saw him in May.      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 This was not an oriented specimen. There was also an incidental basal cell carcinoma which was completely excised. She is also s/p upper blepharoplasty on 11/1/16. She has had an improvement in visual fields after recent cataract surgery. She denies dry eyes, milia formation, visual change, or eye irritation.           Past Medical History:   Diagnosis Date   • Basal cell carcinoma of right cheek 2/8/2017   • Brow ptosis    • Dermatochalasia    • Hypertension    • Melanoma in situ of cheek (CMS/HCC)     Right Cheek   • Pseudoptosis    • Ptosis of eyelid    • Solar lentigo     Right Cheek   • Visual field defect      Past Surgical History:   Procedure Laterality Date   • BLEPHAROPLASTY Bilateral 11/1/2016    Procedure: Bilateral upper blepharoplasty with excision of excessive tissue weighing eyelids down AND excisional biopsy of right cheek lesion with permanent section analysis and linear closure.;  Surgeon: Andriy Sorenson MD;  Location: [facility] OR;  Service:    • BLEPHAROPLASTY     • BREAST BIOPSY     • CHOLECYSTECTOMY     • COLON SURGERY      RESECTION   • HYSTERECTOMY      TOTAL   • OOPHORECTOMY     • SKIN LESION EXCISION       Family History   Problem Relation Age of  "Onset   • Cancer Mother    • Cancer Father      Social History   Substance Use Topics   • Smoking status: Former Smoker     Years: 41.00     Quit date: 2004   • Smokeless tobacco: Never Used   • Alcohol use No     Allergies:  Sulfa antibiotics    Current Outpatient Prescriptions:   •  hydrochlorothiazide (MICROZIDE) 12.5 MG capsule, Take 12.5 mg by mouth Every Morning., Disp: , Rfl:   •  metoprolol tartrate (LOPRESSOR) 50 MG tablet, 1 tab daily, Disp: , Rfl:     Review of Systems   Constitutional: Negative for activity change, chills, fatigue, fever and unexpected weight change.   HENT: Negative for congestion, dental problem, facial swelling and nosebleeds.    Eyes: Negative for discharge and redness.   Musculoskeletal: Negative for neck pain.   Skin: Positive for color change. Negative for pallor and rash.   Hematological: Negative for adenopathy. Does not bruise/bleed easily.   Psychiatric/Behavioral: The patient is nervous/anxious.           /70   Pulse 65   Temp 98 °F (36.7 °C)   Resp 20   Ht 162.6 cm (64\")   Wt 77.1 kg (170 lb)   BMI 29.18 kg/m²     Physical Exam   Constitutional: She is oriented to person, place, and time. She appears well-developed and well-nourished. She is cooperative. No distress.   HENT:   Head: Normocephalic and atraumatic.       Right Ear: External ear normal.   Left Ear: External ear normal.   Nose: Nose normal. No nasal deformity.   Mouth/Throat: Oropharynx is clear and moist.   Eyes: Conjunctivae, EOM and lids are normal. Right eye exhibits no discharge. Left eye exhibits no discharge. No scleral icterus.   Neck: Normal range of motion and phonation normal. Neck supple. No tracheal deviation present.   Pulmonary/Chest: Effort normal. No stridor. No respiratory distress.   Musculoskeletal: Normal range of motion. She exhibits no deformity.   Lymphadenopathy:        Head (right side): No preauricular, no posterior auricular and no occipital adenopathy present.        Head " (left side): No preauricular, no posterior auricular and no occipital adenopathy present.     She has no cervical adenopathy.   Neurological: She is alert and oriented to person, place, and time. She has normal strength. No cranial nerve deficit. Coordination normal.   Skin: Skin is warm and dry. No rash noted. She is not diaphoretic. No erythema. No pallor.   Incision and biopsy site are well healed without evidence of recurrence   Psychiatric: She has a normal mood and affect. Her speech is normal and behavior is normal. Judgment and thought content normal. Cognition and memory are normal.   Nursing note and vitals reviewed.    Melanoma in situ prior to excision:      Today:  Marked spot is the area she notices with the most color change.          Assessment:  Mary was seen today for follow-up.    Diagnoses and all orders for this visit:    Basal cell carcinoma of right cheek    Melanoma in situ of cheek (CMS/HCC)    Solar lentigo    Neoplasm of uncertain behavior    BMI 29.0-29.9,adult       S/p blepharoplasty    Plan:  Staging is [id number] s/p complete excision 11/1/2016.  There is a new lesion today that appears to be a benign solar lentigo.  She is concerned about this and would prefer a biopsy.  This was performed today.  I will call her with pathology and plan.  If benign, F/U 6 months.  If melanoma in situ, will likely refer her to Dr. Alvarez for Slow Mohs.     Protect the incisions from sunlight. Sunlight to the incisions will cause permanent pigmentation to the incision line and make the incision more noticeable. After the incision has reepithelialized, you may begin to use sunscreen with an SPF of 15 or greater    I discussed the use of  Vitamin E, Mederma, or a high-quality extra virgin olive oil to the incisions to optimize the end result. Apply topically twice daily, or as directed, to help optimize wound healing and decrease erythema.    Discussed the importance of routine skin checks with a  dermatologist every 6-12 months. I discussed that she could see Dr. Franco for routine checks and me PRN.  She would like to keep seeing both of us.  F/U with me in 6 months.  If no evidence of recurrent disease, may release back to Dr. Franco for skin checks.    Return in about 6 months (around 2/13/2019).    QUALITY MEASURES    Body Mass Index Screening and Follow-Up Plan  Body mass index is 29.18 kg/m².  Patient's Body mass index is 29.18 kg/m². BMI is above normal parameters. Recommendations include: educational material.    Tobacco Use: Screening and Cessation Intervention  Smoking status: Former Smoker                                                              Packs/day: 0.00      Years: 41.00        Quit date: 2004  Smokeless tobacco: Never Used                              nAdriy Sorenson MD   08/13/18  1:17 PM

## 2018-08-13 NOTE — PROGRESS NOTES
Preprocedure diagnosis: Lentigo versus melanoma in situ of the right cheek    Post procedure diagnosis: Same    Procedure: Punch biopsy    Details:  Patient consent was obtained.  The skin was cleansed with alcohol.  The skin was infiltrated with 1 mL of 1% lidocaine with 1-100,000 epinephrine.  After approximately 10 minutes, a 3 millimeter punch biopsy was taken by placing circular motion on the biopsy tool, the skin was elevated and clipped with iris scissors.  The specimen was sent in formalin.  The skin was closed using a simple 5-0 fast absorbing gut suture.  A Band-aid was placed over the biopsy site.  The patient tolerated the procedure with minimal discomfort.    Andriy Sorenson MD  08/13/18  1:19 PM

## 2018-08-13 NOTE — PATIENT INSTRUCTIONS
##### TOBACCO CESSATION #####   MyPlate from [employer]  The general, healthful diet is based on the 2010 Dietary Guidelines for Americans. The amount of food you need to eat from each food group depends on your age, sex, and level of physical activity and can be individualized by a dietitian. Go to ChooseMyPlate.gov for more information.  What do I need to know about the MyPlate plan?  · Enjoy your food, but eat less.  · Avoid oversized portions.  ? ½ of your plate should include fruits and vegetables.  ? ¼ of your plate should be grains.  ? ¼ of your plate should be protein.  Grains  · Make at least half of your grains whole grains.  · For a 2,000 calorie daily food plan, eat 6 oz every day.  · 1 oz is about 1 slice bread, 1 cup cereal, or ½ cup cooked rice, cereal, or pasta.  Vegetables  · Make half your plate fruits and vegetables.  · For a 2,000 calorie daily food plan, eat 2½ cups every day.  · 1 cup is about 1 cup raw or cooked vegetables or vegetable juice or 2 cups raw leafy greens.  Fruits  · Make half your plate fruits and vegetables.  · For a 2,000 calorie daily food plan, eat 2 cups every day.  · 1 cup is about 1 cup fruit or 100% fruit juice or ½ cup dried fruit.  Protein  · For a 2,000 calorie daily food plan, eat 5½ oz every day.  · 1 oz is about 1 oz meat, poultry, or fish, ¼ cup cooked beans, 1 egg, 1 Tbsp peanut butter, or ½ oz nuts or seeds.  Dairy  · Switch to fat-free or low-fat (1%) milk.  · For a 2,000 calorie daily food plan, eat 3 cups every day.  · 1 cup is about 1 cup milk or yogurt or soy milk (soy beverage), 1½ oz natural cheese, or 2 oz processed cheese.  Fats, Oils, and Empty Calories  · Only small amounts of oils are recommended.  · Empty calories are calories from solid fats or added sugars.  · Compare sodium in foods like soup, bread, and frozen meals. Choose the foods with lower numbers.  · Drink water instead of sugary drinks.  What foods can I eat?  Grains  Whole grains such as whole  wheat, quinoa, millet, and bulgur. Bread, rolls, and pasta made from whole grains. Brown or wild rice. Hot or cold cereals made from whole grains and without added sugar.  Vegetables  All fresh vegetables, especially fresh red, dark green, or orange vegetables. Peas and beans. Low-sodium frozen or canned vegetables prepared without added salt. Low-sodium vegetable juices.  Fruits  All fresh, frozen, and dried fruits. Canned fruit packed in water or fruit juice without added sugar. Fruit juices without added sugar.  Meats and Other Protein Sources  Boiled, baked, or grilled lean meat trimmed of fat. Skinless poultry. Fresh seafood and shellfish. Canned seafood packed in water. Unsalted nuts and unsalted nut butters. Tofu. Dried beans and pea. Eggs.  Dairy  Low-fat or fat-free milk, yogurt, and cheeses.  Sweets and Desserts  Frozen desserts made from low-fat milk.  Fats and Oils  Olive, peanut, and canola oils and margarine. Salad dressing and mayonnaise made from these oils.  Other  Soups and casseroles made from allowed ingredients and without added fat or salt.  The items listed above may not be a complete list of recommended foods or beverages. Contact your dietitian for more options.  What foods are not recommended?  Grains  Sweetened, low-fiber cereals. Packaged baked goods. Snack crackers and chips. Cheese crackers, butter crackers, and biscuits. Frozen waffles, sweet breads, doughnuts, pastries, packaged baking mixes, pancakes, cakes, and cookies.  Vegetables  Regular canned or frozen vegetables or vegetables prepared with salt. Canned tomatoes. Canned tomato sauce. Fried vegetables. Vegetables in cream sauce or cheese sauce.  Fruits  Fruits packed in syrup or made with added sugar.  Meats and Other Protein Sources  Marbled or fatty meats such as ribs. Poultry with skin. Fried meats, poultry, eggs, or fish. Sausages, hot dogs, and deli meats such as pastrami, bologna, or salami.  Dairy  Whole milk, cream,  cheeses made from whole milk, sour cream. Ice cream or yogurt made from whole milk or with added sugar.  Beverages  For adults, no more than one alcoholic drink per day. Regular soft drinks or other sugary beverages. Juice drinks.  Sweets and Desserts  Sugary or fatty desserts, candy, and other sweets.  Fats and Oils  Solid shortening or partially hydrogenated oils. Solid margarine. Margarine that contains trans fats. Butter.  The items listed above may not be a complete list of foods and beverages to avoid. Contact your dietitian for more information.  This information is not intended to replace advice given to you by your health care provider. Make sure you discuss any questions you have with your health care provider.  Document Released: 01/06/2009 Document Revised: 05/25/2017 Document Reviewed: 11/26/2014  [employer] Interactive Patient Education © 2018 [employer]

## 2018-08-16 ENCOUNTER — TELEPHONE (OUTPATIENT)
Dept: OTOLARYNGOLOGY | Facility: CLINIC | Age: 78
End: 2018-08-16

## 2018-08-16 LAB
CYTO UR: NORMAL
LAB AP CASE REPORT: NORMAL
LAB AP CLINICAL INFORMATION: NORMAL
LAB AP INTRADEPARTMENTAL CONSULT: NORMAL
PATH REPORT.FINAL DX SPEC: NORMAL
PATH REPORT.GROSS SPEC: NORMAL

## 2018-08-16 NOTE — TELEPHONE ENCOUNTER
I called mobile and home numbers to discuss benign pathology.  No answer.  Will try again.    Andriy Sorenson MD

## 2018-08-29 ENCOUNTER — TELEPHONE (OUTPATIENT)
Dept: OTOLARYNGOLOGY | Facility: CLINIC | Age: 78
End: 2018-08-29

## 2018-08-29 NOTE — TELEPHONE ENCOUNTER
Patient notified of benign pathology result from right cheek biopsy on 08/13/2018. Advised to keep follow up appointment as scheduled in Feb 2019.

## 2019-02-13 ENCOUNTER — OFFICE VISIT (OUTPATIENT)
Dept: OTOLARYNGOLOGY | Facility: CLINIC | Age: 79
End: 2019-02-13

## 2019-02-13 VITALS
DIASTOLIC BLOOD PRESSURE: 78 MMHG | HEIGHT: 64 IN | SYSTOLIC BLOOD PRESSURE: 140 MMHG | RESPIRATION RATE: 20 BRPM | HEART RATE: 81 BPM | BODY MASS INDEX: 29.02 KG/M2 | WEIGHT: 170 LBS | TEMPERATURE: 98 F

## 2019-02-13 DIAGNOSIS — D03.39 MELANOMA IN SITU OF CHEEK (HCC): ICD-10-CM

## 2019-02-13 DIAGNOSIS — C44.319 BASAL CELL CARCINOMA OF RIGHT CHEEK: ICD-10-CM

## 2019-02-13 DIAGNOSIS — D48.9 NEOPLASM OF UNCERTAIN BEHAVIOR: ICD-10-CM

## 2019-02-13 DIAGNOSIS — L57.0 ACTINIC KERATOSIS: Primary | ICD-10-CM

## 2019-02-13 DIAGNOSIS — L81.4 SOLAR LENTIGO: ICD-10-CM

## 2019-02-13 PROCEDURE — 99214 OFFICE O/P EST MOD 30 MIN: CPT | Performed by: OTOLARYNGOLOGY

## 2019-02-13 RX ORDER — TIZANIDINE 4 MG/1
4 TABLET ORAL
COMMUNITY
Start: 2016-11-15 | End: 2023-02-20

## 2019-02-13 NOTE — PROGRESS NOTES
Chief Complaint   Patient presents with   • Follow-up     1) BCC/ MELANOMA  2) New lesion on right cheek       Melanoma Follow-Up    Mrs. Cleveland presents for follow-up general head and neck skin examination.  Patient has a history of basal cell carcinoma and a malignant melanoma.  On her last visit, she had noted some darkening of the skin of the right supero-lateral cheek.  This started sometime after May.  No bleeding.  I biopsy was performed c/w solar lentigo    She sees Dr. Franco - she will see him again in the summer.    She complains of a new lesion of the right cheek.  This is been present for a few weeks.  It is irritated and waxes and wanes.  Nothing makes it better or worse.    She also complains of a new spot of the left chest.  This is slightly pigmented.  It is not ulcerating or bothering her.  She just noticed it and is very peculiar about new spots.        Past Medical History:   Diagnosis Date   • Basal cell carcinoma of right cheek 2/8/2017   • Brow ptosis    • Dermatochalasia    • Hypertension    • Melanoma in situ of cheek (CMS/HCC)     Right Cheek   • Pseudoptosis    • Ptosis of eyelid    • Solar lentigo     Right Cheek   • Visual field defect      Past Surgical History:   Procedure Laterality Date   • BLEPHAROPLASTY Bilateral 11/1/2016    Procedure: Bilateral upper blepharoplasty with excision of excessive tissue weighing eyelids down AND excisional biopsy of right cheek lesion with permanent section analysis and linear closure.;  Surgeon: Andriy Sorenson MD;  Location: [facility];  Service:    • BLEPHAROPLASTY     • BREAST BIOPSY     • CHOLECYSTECTOMY     • COLON SURGERY      RESECTION   • HYSTERECTOMY      TOTAL   • OOPHORECTOMY     • SKIN LESION EXCISION       Family History   Problem Relation Age of Onset   • Cancer Mother    • Cancer Father      Social History     Tobacco Use   • Smoking status: Former Smoker     Years: 41.00     Last attempt to quit: 2004     Years since quitting: 15.1   •  "Smokeless tobacco: Never Used   Substance Use Topics   • Alcohol use: No   • Drug use: No     Allergies:  Sulfa antibiotics    Current Outpatient Medications:   •  hydrochlorothiazide (MICROZIDE) 12.5 MG capsule, Take 12.5 mg by mouth Every Morning., Disp: , Rfl:   •  metoprolol tartrate (LOPRESSOR) 50 MG tablet, 1 tab daily, Disp: , Rfl:   •  tiZANidine (ZANAFLEX) 4 MG tablet, Take 4 mg by mouth., Disp: , Rfl:   •  Ingenol Mebutate (PICATO) 0.015 % gel, Apply 1 application topically Daily., Disp: 3 each, Rfl: 0    Current Facility-Administered Medications:   •  mupirocin (BACTROBAN) 2 % ointment, , Topical, Q12H, Andriy Sorenson MD    Review of Systems   Constitutional: Negative for activity change, chills, fatigue, fever and unexpected weight change.   HENT: Negative for congestion, dental problem, facial swelling and nosebleeds.    Eyes: Negative for discharge and redness.   Musculoskeletal: Negative for neck pain.   Skin: Positive for color change. Negative for pallor and rash.   Hematological: Negative for adenopathy. Does not bruise/bleed easily.   Psychiatric/Behavioral: The patient is nervous/anxious.           /78   Pulse 81   Temp 98 °F (36.7 °C)   Resp 20   Ht 162.6 cm (64\")   Wt 77.1 kg (170 lb)   BMI 29.18 kg/m²     Physical Exam   Constitutional: She is oriented to person, place, and time. She appears well-developed and well-nourished. She is cooperative. No distress.   HENT:   Head: Normocephalic and atraumatic.       Right Ear: External ear normal.   Left Ear: External ear normal.   Nose: Nose normal. No nasal deformity.   Mouth/Throat: Oropharynx is clear and moist.   Eyes: Conjunctivae, EOM and lids are normal. Right eye exhibits no discharge. Left eye exhibits no discharge. No scleral icterus.   Neck: Normal range of motion and phonation normal. Neck supple. No tracheal deviation present.   Pulmonary/Chest: Effort normal. No stridor. No respiratory distress.   Musculoskeletal: Normal " range of motion. She exhibits no deformity.   Lymphadenopathy:        Head (right side): No preauricular, no posterior auricular and no occipital adenopathy present.        Head (left side): No preauricular, no posterior auricular and no occipital adenopathy present.     She has no cervical adenopathy.   Neurological: She is alert and oriented to person, place, and time. She has normal strength. No cranial nerve deficit. Coordination normal.   Skin: Skin is warm and dry. No rash noted. She is not diaphoretic. No erythema. No pallor.        Incision and biopsy site are well healed without evidence of recurrence   Psychiatric: She has a normal mood and affect. Her speech is normal and behavior is normal. Judgment and thought content normal. Cognition and memory are normal.   Nursing note and vitals reviewed.      Assessment:  Mary was seen today for follow-up.    Diagnoses and all orders for this visit:    Actinic keratosis  -     mupirocin (BACTROBAN) 2 % ointment; Apply  topically to the appropriate area as directed Every 12 (Twelve) Hours.    Neoplasm of uncertain behavior    Solar lentigo    Melanoma in situ of cheek (CMS/HCC)    Basal cell carcinoma of right cheek    Other orders  -     Ingenol Mebutate (PICATO) 0.015 % gel; Apply 1 application topically Daily.       S/p blepharoplasty    Plan:  Staging is [id number] s/p complete excision 11/1/2016.  There is a new lesion today that appears to be a benign solar lentigo of the chest.  This bee not appear concerning to me.  Discussed biopsy versus observation.  She would like to observe.  F/U 6 months.  The right cheek lesion appears like an AK - Picato prescribed (aloing with mupirocin).      Protect the incisions from sunlight. Sunlight to the incisions will cause permanent pigmentation to the incision line and make the incision more noticeable. After the incision has reepithelialized, you may begin to use sunscreen with an SPF of 15 or greater    I discussed  the use of  Vitamin E, Mederma, or a high-quality extra virgin olive oil to the incisions to optimize the end result. Apply topically twice daily, or as directed, to help optimize wound healing and decrease erythema.    Discussed the importance of routine skin checks with a dermatologist every 6-12 months. I discussed that she could see Dr. Franco for routine checks and me PRN.  She would like to keep seeing both of us.  F/U with me in 6 months.     Return in about 6 months (around 8/13/2019).    QUALITY MEASURES    Body Mass Index Screening and Follow-Up Plan  Body mass index is 29.18 kg/m².  Patient's Body mass index is 29.18 kg/m². BMI is above normal parameters. Recommendations include: educational material.    Tobacco Use: Screening and Cessation Intervention  Social History    Tobacco Use      Smoking status: Former Smoker        Years: 41.00        Quit date: 2004        Years since quitting: 15.1      Smokeless tobacco: Never Used          Andriy Sorenson MD   02/13/19  11:30 AM

## 2019-08-14 ENCOUNTER — OFFICE VISIT (OUTPATIENT)
Dept: OTOLARYNGOLOGY | Facility: CLINIC | Age: 79
End: 2019-08-14

## 2019-08-14 VITALS
TEMPERATURE: 97 F | SYSTOLIC BLOOD PRESSURE: 119 MMHG | DIASTOLIC BLOOD PRESSURE: 84 MMHG | BODY MASS INDEX: 28.99 KG/M2 | HEIGHT: 64 IN | HEART RATE: 54 BPM | WEIGHT: 169.8 LBS

## 2019-08-14 DIAGNOSIS — C44.319 BASAL CELL CARCINOMA OF RIGHT CHEEK: ICD-10-CM

## 2019-08-14 DIAGNOSIS — D03.39 MELANOMA IN SITU OF CHEEK (HCC): ICD-10-CM

## 2019-08-14 DIAGNOSIS — L81.4 SOLAR LENTIGO: Primary | ICD-10-CM

## 2019-08-14 PROCEDURE — 99213 OFFICE O/P EST LOW 20 MIN: CPT | Performed by: OTOLARYNGOLOGY

## 2019-08-14 RX ORDER — TRAMADOL HYDROCHLORIDE 50 MG/1
50 TABLET ORAL 3 TIMES DAILY PRN
Refills: 1 | COMMUNITY
Start: 2019-06-14 | End: 2023-02-20

## 2019-08-14 RX ORDER — METOPROLOL SUCCINATE 50 MG/1
50 TABLET, EXTENDED RELEASE ORAL
COMMUNITY
Start: 2016-06-09 | End: 2019-08-14

## 2019-08-14 NOTE — PROGRESS NOTES
Chief Complaint   Patient presents with   • Follow-up     1) BCC/ MELANOMA  2) New lesion on right cheek       Melanoma Follow-Up    Mrs. Cleveland presents for follow-up general head and neck skin examination.  Patient has a history of basal cell carcinoma and a malignant melanoma.  On 8/13/18, she had noted some darkening of the skin of the right supero-lateral cheek.  This had started a few months prior. A biopsy was performed c/w solar lentigo.  On 8/13/2018 she complained of a new lesion on her right cheek that was pigmented.  This was biopsied consistent with elastosis, mild hyperpigmentation, negative for melanoma or melanocytic proliferation.    Today, she complains of a new, pigmented lesion of the right cheek/temple.  This is been present for a few months. She has not noticed any particular changes, but states her previous melanoma looked like this when it first appeared. Nothing makes this better or worse.     She denies weight loss, ear pain, neck pain, headaches, or lymph node enlargement. She does note some cervical neck pain.     She sees Dr. Franco      Past Medical History:   Diagnosis Date   • Basal cell carcinoma of right cheek 2/8/2017   • Brow ptosis    • Dermatochalasia    • Hypertension    • Melanoma in situ of cheek (CMS/HCC)     Right Cheek   • Pseudoptosis    • Ptosis of eyelid    • Solar lentigo     Right Cheek   • Visual field defect      Past Surgical History:   Procedure Laterality Date   • BLEPHAROPLASTY Bilateral 11/1/2016    Procedure: Bilateral upper blepharoplasty with excision of excessive tissue weighing eyelids down AND excisional biopsy of right cheek lesion with permanent section analysis and linear closure.;  Surgeon: Andriy Sorenson MD;  Location: [facility] OR;  Service:    • BLEPHAROPLASTY     • BREAST BIOPSY     • CHOLECYSTECTOMY     • COLON SURGERY      RESECTION   • HYSTERECTOMY      TOTAL   • OOPHORECTOMY     • SKIN LESION EXCISION       Family History   Problem Relation Age of  "Onset   • Cancer Mother    • Cancer Father      Social History     Tobacco Use   • Smoking status: Former Smoker     Years: 41.00     Last attempt to quit: 2004     Years since quitting: 15.6   • Smokeless tobacco: Never Used   Substance Use Topics   • Alcohol use: No   • Drug use: No     Allergies:  Sulfa antibiotics    Current Outpatient Medications:   •  hydrochlorothiazide (MICROZIDE) 12.5 MG capsule, Take 12.5 mg by mouth Every Morning., Disp: , Rfl:   •  metoprolol tartrate (LOPRESSOR) 50 MG tablet, 1 tab daily, Disp: , Rfl:   •  tiZANidine (ZANAFLEX) 4 MG tablet, Take 4 mg by mouth., Disp: , Rfl:   •  traMADol (ULTRAM) 50 MG tablet, Take 50 mg by mouth 3 (Three) Times a Day As Needed. for pain, Disp: , Rfl: 1  No current facility-administered medications for this visit.     Review of Systems   Constitutional: Negative for activity change, chills, fatigue, fever and unexpected weight change.   HENT: Negative for congestion, dental problem, facial swelling and nosebleeds.    Eyes: Negative for discharge and redness.   Musculoskeletal: Negative for neck pain.   Skin: Positive for color change. Negative for pallor and rash.   Hematological: Negative for adenopathy. Does not bruise/bleed easily.   Psychiatric/Behavioral: The patient is nervous/anxious.           /84   Pulse 54   Temp 97 °F (36.1 °C)   Ht 162.6 cm (64\")   Wt 77 kg (169 lb 12.8 oz)   BMI 29.15 kg/m²     Physical Exam   Constitutional: She is oriented to person, place, and time. She appears well-developed and well-nourished. She is cooperative. No distress.   HENT:   Head: Normocephalic and atraumatic.       Right Ear: External ear normal.   Left Ear: External ear normal.   Nose: Nose normal. No nasal deformity.   Mouth/Throat: Oropharynx is clear and moist.   Eyes: Conjunctivae, EOM and lids are normal. Right eye exhibits no discharge. Left eye exhibits no discharge. No scleral icterus.   Neck: Normal range of motion and phonation normal. " Neck supple. No tracheal deviation present.   Pulmonary/Chest: Effort normal. No stridor. No respiratory distress.   Musculoskeletal: Normal range of motion. She exhibits no deformity.   Lymphadenopathy:        Head (right side): No preauricular, no posterior auricular and no occipital adenopathy present.        Head (left side): No preauricular, no posterior auricular and no occipital adenopathy present.     She has no cervical adenopathy.   Neurological: She is alert and oriented to person, place, and time. She has normal strength. No cranial nerve deficit. Coordination normal.   Skin: Skin is warm and dry. No rash noted. She is not diaphoretic. No erythema. No pallor.        Incision and biopsy site are well healed without evidence of recurrence   Psychiatric: She has a normal mood and affect. Her speech is normal and behavior is normal. Judgment and thought content normal. Cognition and memory are normal.   Nursing note and vitals reviewed.      Assessment:  Mary was seen today for follow-up.    Diagnoses and all orders for this visit:    Solar lentigo    Melanoma in situ of cheek (CMS/HCC)    Basal cell carcinoma of right cheek    S/p blepharoplasty    Plan:  Staging is [id number] s/p complete excision 11/1/2016.  There is a new lesion today that appears to be a benign solar lentigo of the right cheek.  This does not appear concerning to me and I have very low suspicion for malignancy.  Discussed biopsy versus observation.  She would like to observe and will follow-up in 6 months.  She was instructed to call or return sooner should she notice any changes prior to her 6 month follow-up.     Protect the incisions from sunlight. Sunlight to the incisions will cause permanent pigmentation to the incision line and make the incision more noticeable. After the incision has reepithelialized, you may begin to use sunscreen with an SPF of 15 or greater    Return in about 6 months (around 2/14/2020) for Recheck.    Andriy  REANNA Sorenson MD   08/14/19  1:02 PM

## 2019-12-17 ENCOUNTER — TRANSCRIBE ORDERS (OUTPATIENT)
Dept: ADMINISTRATIVE | Facility: HOSPITAL | Age: 79
End: 2019-12-17

## 2019-12-17 DIAGNOSIS — R91.8 MULTIPLE LUNG NODULES: Primary | ICD-10-CM

## 2019-12-20 ENCOUNTER — APPOINTMENT (OUTPATIENT)
Dept: CT IMAGING | Facility: HOSPITAL | Age: 79
End: 2019-12-20

## 2020-01-02 ENCOUNTER — HOSPITAL ENCOUNTER (OUTPATIENT)
Dept: CT IMAGING | Facility: HOSPITAL | Age: 80
Discharge: HOME | End: 2020-01-02
Admitting: INTERNAL MEDICINE

## 2020-01-02 PROCEDURE — 71250 CT THORAX DX C-: CPT

## 2020-07-07 ENCOUNTER — OFFICE VISIT (OUTPATIENT)
Dept: CARDIOLOGY | Facility: CLINIC | Age: 80
End: 2020-07-07

## 2020-07-07 VITALS
WEIGHT: 175 LBS | HEIGHT: 64 IN | HEART RATE: 55 BPM | BODY MASS INDEX: 29.88 KG/M2 | DIASTOLIC BLOOD PRESSURE: 62 MMHG | SYSTOLIC BLOOD PRESSURE: 123 MMHG

## 2020-07-07 DIAGNOSIS — Z13.6 SCREENING FOR ISCHEMIC HEART DISEASE (IHD): Primary | ICD-10-CM

## 2020-07-07 DIAGNOSIS — I10 ESSENTIAL HYPERTENSION: ICD-10-CM

## 2020-07-07 PROCEDURE — 99203 OFFICE O/P NEW LOW 30 MIN: CPT | Performed by: INTERNAL MEDICINE

## 2020-07-07 PROCEDURE — 93000 ELECTROCARDIOGRAM COMPLETE: CPT | Performed by: INTERNAL MEDICINE

## 2020-07-07 NOTE — PROGRESS NOTES
"Subjective    Mary Cleveland is a 79 y.o. female. Referred by pcp at pts request for eval for possible ihd    History of Present Illness     IHD SCREENING:  Has had remote cardiac cath and neg stress test since. \"I know you are supposed to have a heart check periodically and I haven't had one in years\". Is active and has good stamina and never has cp. EKG today is AMANDA and nsc ct 10/16. She had a chest CT  and minimal cor calcification was noted.    HTN:  Is compliant with meds and is getting good clinic checks on her current meds. Low-grade sbrad today is probably Beta-blocker related and she has good energy and stamina.        The following portions of the patient's history were reviewed and updated as appropriate: allergies, current medications, past family history, past medical history, past social history, past surgical history and problem list.    Patient Active Problem List   Diagnosis   • Basal cell carcinoma of right cheek   • Melanoma in situ of cheek (CMS/HCC)   • Screening for ischemic heart disease (IHD)   • Hypertension       Allergies   Allergen Reactions   • Sulfa Antibiotics      Unknown childhood reaction       Family History   Problem Relation Age of Onset   • Cancer Mother    • Heart failure Mother    • Cancer Father        Social History     Socioeconomic History   • Marital status:      Spouse name: Not on file   • Number of children: Not on file   • Years of education: Not on file   • Highest education level: Not on file   Tobacco Use   • Smoking status: Former Smoker     Years: 41.00     Last attempt to quit:      Years since quittin.5   • Smokeless tobacco: Never Used   Substance and Sexual Activity   • Alcohol use: No   • Drug use: No   • Sexual activity: Defer         Current Outpatient Medications:   •  hydrochlorothiazide (MICROZIDE) 12.5 MG capsule, Take 12.5 mg by mouth Every Morning., Disp: , Rfl:   •  metoprolol tartrate (LOPRESSOR) 50 MG tablet, 1 tab daily, Disp: , " "Rfl:   •  traMADol (ULTRAM) 50 MG tablet, Take 50 mg by mouth 3 (Three) Times a Day As Needed. for pain, Disp: , Rfl: 1  •  tiZANidine (ZANAFLEX) 4 MG tablet, Take 4 mg by mouth., Disp: , Rfl:     Past Surgical History:   Procedure Laterality Date   • BLEPHAROPLASTY Bilateral 11/1/2016    Procedure: Bilateral upper blepharoplasty with excision of excessive tissue weighing eyelids down AND excisional biopsy of right cheek lesion with permanent section analysis and linear closure.;  Surgeon: Andriy Sorenson MD;  Location: [facility] OR;  Service:    • BLEPHAROPLASTY     • BREAST BIOPSY     • CHOLECYSTECTOMY     • COLON SURGERY      RESECTION   • HYSTERECTOMY      TOTAL   • OOPHORECTOMY     • SKIN LESION EXCISION         Review of Systems   Constitutional: Negative for fatigue, fever and unexpected weight change.   HENT: Negative for congestion.    Eyes: Negative for visual disturbance.   Respiratory: Negative for apnea, shortness of breath and wheezing.    Cardiovascular: Negative for chest pain, palpitations and leg swelling.   Gastrointestinal: Negative for abdominal pain and vomiting.   Endocrine: Negative for cold intolerance and heat intolerance.   Genitourinary: Negative for difficulty urinating.   Musculoskeletal: Negative for myalgias.   Skin: Negative for rash.   Neurological: Negative for syncope.   Hematological: Does not bruise/bleed easily.   Psychiatric/Behavioral: Negative for sleep disturbance.       /62   Pulse 55   Ht 162.6 cm (64\")   Wt 79.4 kg (175 lb)   BMI 30.04 kg/m²   Procedures    Objective   Physical Exam   Constitutional: She is oriented to person, place, and time. She appears well-developed and well-nourished. No distress.   HENT:   Head: Normocephalic.   Eyes: Pupils are equal, round, and reactive to light.   Neck: No thyromegaly present.   Cardiovascular: Regular rhythm, normal heart sounds and normal pulses. Bradycardia present. Exam reveals no gallop and no friction rub.   No " murmur heard.  Pulmonary/Chest: Effort normal. No stridor. No respiratory distress. She has no wheezes. She has no rales.   Abdominal: Soft. Bowel sounds are normal. She exhibits no distension and no mass. There is no tenderness. There is no guarding.   Musculoskeletal: She exhibits no edema, tenderness or deformity.   Neurological: She is alert and oriented to person, place, and time.   Skin: Skin is warm and dry. She is not diaphoretic.   Psychiatric: She has a normal mood and affect.       Assessment/Plan   Mary was seen today for hypertension.    Diagnoses and all orders for this visit:    Screening for ischemic heart disease (IHD)  Comments:  no evidence of significant cardiac pathology  Orders:  -     ECG 12 Lead    Essential hypertension  Comments:  well controlled                 Return if symptoms worsen or fail to improve.  Orders Placed This Encounter   Procedures   • ECG 12 Lead     Order Specific Question:   Reason for Exam:     Answer:   htn

## 2020-07-29 ENCOUNTER — OFFICE VISIT (OUTPATIENT)
Dept: OTOLARYNGOLOGY | Facility: CLINIC | Age: 80
End: 2020-07-29

## 2020-07-29 VITALS
BODY MASS INDEX: 30.14 KG/M2 | TEMPERATURE: 97.4 F | SYSTOLIC BLOOD PRESSURE: 136 MMHG | DIASTOLIC BLOOD PRESSURE: 61 MMHG | WEIGHT: 175.6 LBS | HEART RATE: 64 BPM

## 2020-07-29 DIAGNOSIS — D03.39 MELANOMA IN SITU OF CHEEK (HCC): Primary | ICD-10-CM

## 2020-07-29 DIAGNOSIS — D48.9 NEOPLASM OF UNCERTAIN BEHAVIOR: ICD-10-CM

## 2020-07-29 PROCEDURE — 11105 PUNCH BX SKIN EA SEP/ADDL: CPT | Performed by: OTOLARYNGOLOGY

## 2020-07-29 PROCEDURE — 99213 OFFICE O/P EST LOW 20 MIN: CPT | Performed by: OTOLARYNGOLOGY

## 2020-07-29 PROCEDURE — 11104 PUNCH BX SKIN SINGLE LESION: CPT | Performed by: OTOLARYNGOLOGY

## 2020-07-29 PROCEDURE — 11103 TANGNTL BX SKIN EA SEP/ADDL: CPT | Performed by: OTOLARYNGOLOGY

## 2020-07-29 PROCEDURE — 88305 TISSUE EXAM BY PATHOLOGIST: CPT | Performed by: OTOLARYNGOLOGY

## 2020-07-29 NOTE — PROGRESS NOTES
Chief Complaint   Patient presents with   • Follow-up     1) BCC/ MELANOMA  2) New lesion on right cheek       Melanoma Follow-Up    Mrs. Cleveland presents for follow-up general head and neck skin examination and checkup of a lesion of the right cheek.  She has 2 additional lesions she would like to be evaluated of the right cheek/temple area:    Location: Right temple (Lesion # 1 in photo)  Quality: pigmented  Severity: mild  Duration: slightly over 1 year  Timing: constant  Modifying Factors: none  Associated Signs & Symptoms: No LAD, neck pain.    Location: Right superior cheek (Lesion #2 in photo)  Quality: pigmented  Severity: mild  Duration: 6 months  Timing: constant  Modifying Factors: none  Associated Signs & Symptoms: No LAD, neck pain.    Location: right lateral cheek (Lesion #3 in photo)  Quality: raised lesion  Severity: mild  Duration: 6 months  Timing: constant  Modifying Factors: none  Associated Signs & Symptoms: No LAD, neck pain    She has a history of basal cell carcinoma and a malignant melanoma.      She sees Dr. Franco      Past Medical History:   Diagnosis Date   • Basal cell carcinoma of right cheek 2/8/2017   • Brow ptosis    • Dermatochalasia    • Hypertension    • Melanoma in situ of cheek (CMS/HCC)     Right Cheek   • Pseudoptosis    • Ptosis of eyelid    • Solar lentigo     Right Cheek   • Visual field defect      Past Surgical History:   Procedure Laterality Date   • BLEPHAROPLASTY Bilateral 11/1/2016    Procedure: Bilateral upper blepharoplasty with excision of excessive tissue weighing eyelids down AND excisional biopsy of right cheek lesion with permanent section analysis and linear closure.;  Surgeon: Andriy Sorenson MD;  Location: [facility];  Service:    • BLEPHAROPLASTY     • BREAST BIOPSY     • CHOLECYSTECTOMY     • COLON SURGERY      RESECTION   • HYSTERECTOMY      TOTAL   • OOPHORECTOMY     • SKIN LESION EXCISION       Family History   Problem Relation Age of Onset   • Cancer Mother     • Heart failure Mother    • Cancer Father      Social History     Tobacco Use   • Smoking status: Former Smoker     Years: 41.00     Last attempt to quit:      Years since quittin.5   • Smokeless tobacco: Never Used   Substance Use Topics   • Alcohol use: No   • Drug use: No     Allergies:  Sulfa antibiotics    Current Outpatient Medications:   •  hydrochlorothiazide (MICROZIDE) 12.5 MG capsule, Take 12.5 mg by mouth Every Morning., Disp: , Rfl:   •  metoprolol tartrate (LOPRESSOR) 50 MG tablet, 1 tab daily, Disp: , Rfl:   •  tiZANidine (ZANAFLEX) 4 MG tablet, Take 4 mg by mouth., Disp: , Rfl:   •  traMADol (ULTRAM) 50 MG tablet, Take 50 mg by mouth 3 (Three) Times a Day As Needed. for pain, Disp: , Rfl: 1    Review of Systems   Constitutional: Negative for activity change, chills, fatigue, fever and unexpected weight change.   HENT: Negative for congestion, dental problem, facial swelling and nosebleeds.    Eyes: Negative for discharge and redness.   Musculoskeletal: Negative for neck pain.   Skin: Positive for color change. Negative for pallor and rash.   Hematological: Negative for adenopathy. Does not bruise/bleed easily.   Psychiatric/Behavioral: The patient is nervous/anxious.           /61   Pulse 64   Temp 97.4 °F (36.3 °C) (Temporal)   Wt 79.7 kg (175 lb 9.6 oz)   BMI 30.14 kg/m²     Physical Exam   Constitutional: She is oriented to person, place, and time. She appears well-developed and well-nourished. She is cooperative. No distress.   HENT:   Head: Normocephalic and atraumatic.       Right Ear: External ear normal.   Left Ear: External ear normal.   Nose: Nose normal. No nasal deformity.   Mouth/Throat: Oropharynx is clear and moist.   Eyes: Conjunctivae, EOM and lids are normal. Right eye exhibits no discharge. Left eye exhibits no discharge. No scleral icterus.   Neck: Normal range of motion and phonation normal. Neck supple. No tracheal deviation present.   Pulmonary/Chest:  Effort normal. No stridor. No respiratory distress.   Musculoskeletal: Normal range of motion. She exhibits no deformity.   Lymphadenopathy:        Head (right side): No preauricular, no posterior auricular and no occipital adenopathy present.        Head (left side): No preauricular, no posterior auricular and no occipital adenopathy present.     She has no cervical adenopathy.   Neurological: She is alert and oriented to person, place, and time. She has normal strength. No cranial nerve deficit. Coordination normal.   Skin: Skin is warm and dry. No rash noted. She is not diaphoretic. No erythema. No pallor.   Incision and biopsy site are well healed without evidence of recurrence   Psychiatric: She has a normal mood and affect. Her speech is normal and behavior is normal. Judgment and thought content normal. Cognition and memory are normal.   Nursing note and vitals reviewed.        Assessment:  Mary was seen today for follow-up.    Diagnoses and all orders for this visit:    Melanoma in situ of cheek (CMS/HCC)    Neoplasm of uncertain behavior    S/p blepharoplasty    Plan:  Melanoma staging is [id number] s/p complete excision 11/1/2016 (Right cheek).      I have offered punch biopsy of lesions #1 and #2, and shave biopsy of lesion #3.  We will call her in 1 to 2 weeks with the pathology and plan.  Otherwise, I would like to see her in 6 and 12 months to complete her melanoma surveillance.    Discussion of skin lesion. Discussed risks, benefits, alternatives, and possible complications of excision of the skin lesion with reconstruction utilizing local tissue rearrangement, full-thickness skin grafting, or local interpolated flaps. Risks include, but are not limited too: bleeding, infection, hematoma, recurrence, need for additional procedures, flap failure, cosmetic deformity. Patient understands risks and would like to proceed with surgery.     Return in about 6 months (around 1/29/2021).    Andriy Sorenson MD    07/29/20  14:13

## 2020-07-29 NOTE — PROGRESS NOTES
Preprocedure diagnosis: Dysplastic nevus versus melanoma of the right temple (Lesion #1)    Post procedure diagnosis: Same    Procedure: Punch biopsy    Details:  Patient consent was obtained.  The skin was cleansed with alcohol.  The skin was infiltrated with 1 mL of 1% lidocaine with 1-100,000 epinephrine.  After approximately 10 minutes, a 3 millimeter punch biopsy was taken by placing circular motion on the biopsy tool, the skin was elevated and clipped with iris scissors.  The specimen was sent in formalin.  The skin was closed using a simple 5-0 fast absorbing gut suture.  Antibiotic ointment was placed over the biopsy site.  The patient tolerated the procedure with minimal discomfort.    Andriy Sorenson MD  07/29/20  14:16    Preprocedure diagnosis: Dysplastic nevus versus melanoma of the right cheek (Lesion #2)    Post procedure diagnosis: Same    Procedure: Punch biopsy    Details:  Patient consent was obtained.  The skin was cleansed with alcohol.  The skin was infiltrated with 1 mL of 1% lidocaine with 1-100,000 epinephrine.  After approximately 10 minutes, a 3 millimeter punch biopsy was taken by placing circular motion on the biopsy tool, the skin was elevated and clipped with iris scissors.  The specimen was sent in formalin.  The skin was closed using a simple 5-0 fast absorbing gut suture.  Antibiotic ointment was placed over the biopsy site.  The patient tolerated the procedure with minimal discomfort.    Andriy Sorenson MD  07/29/20  14:16    Preprocedure diagnosis: Clinically suspicious for basal cell carcinoma of the right cheek (Lesion #3)    Post procedure diagnosis: Same    Procedure: Shave biopsy    Details:  Patient consent was obtained.  The skin was cleansed with alcohol.  The skin was infiltrated with 1 mL of 1% lidocaine with 1-100,000 epinephrine.  After approximately 10 minutes, a #15 blade was used to perform the shave biopsy. The specimen was lifted off the defect and sent in  formalin for permanent section pathology. Pressure was applied to the wound. Antibiotic ointment was placed over the biopsy site.  The patient tolerated the procedure with minimal discomfort.      Andriy Sorenson MD  07/29/20  14:17

## 2020-08-06 LAB
CYTO UR: NORMAL
LAB AP CASE REPORT: NORMAL
LAB AP CLINICAL INFORMATION: NORMAL
PATH REPORT.FINAL DX SPEC: NORMAL
PATH REPORT.GROSS SPEC: NORMAL

## 2020-08-07 ENCOUNTER — TELEPHONE (OUTPATIENT)
Dept: OTOLARYNGOLOGY | Facility: CLINIC | Age: 80
End: 2020-08-07

## 2020-08-17 LAB
ALBUMIN SERPL-MCNC: 4.2 G/DL (ref 3.5–5.2)
ALP BLD-CCNC: 73 U/L (ref 35–104)
ALT SERPL-CCNC: 20 U/L (ref 5–33)
ANION GAP SERPL CALCULATED.3IONS-SCNC: 7 MMOL/L (ref 7–19)
AST SERPL-CCNC: 18 U/L (ref 5–32)
BASOPHILS ABSOLUTE: 0 K/UL (ref 0–0.2)
BASOPHILS RELATIVE PERCENT: 0.4 % (ref 0–1)
BILIRUB SERPL-MCNC: 0.6 MG/DL (ref 0.2–1.2)
BUN BLDV-MCNC: 16 MG/DL (ref 8–23)
CALCIUM SERPL-MCNC: 9.2 MG/DL (ref 8.8–10.2)
CHLORIDE BLD-SCNC: 106 MMOL/L (ref 98–111)
CHOLESTEROL, TOTAL: 157 MG/DL (ref 160–199)
CO2: 29 MMOL/L (ref 22–29)
CREAT SERPL-MCNC: 0.8 MG/DL (ref 0.5–0.9)
EOSINOPHILS ABSOLUTE: 0.2 K/UL (ref 0–0.6)
EOSINOPHILS RELATIVE PERCENT: 2.9 % (ref 0–5)
GFR AFRICAN AMERICAN: >59
GFR NON-AFRICAN AMERICAN: >60
GLUCOSE BLD-MCNC: 94 MG/DL (ref 74–109)
HCT VFR BLD CALC: 40.8 % (ref 37–47)
HDLC SERPL-MCNC: 49 MG/DL (ref 65–121)
HEMOGLOBIN: 13.1 G/DL (ref 12–16)
IMMATURE GRANULOCYTES #: 0 K/UL
LDL CHOLESTEROL CALCULATED: 83 MG/DL
LYMPHOCYTES ABSOLUTE: 1.4 K/UL (ref 1.1–4.5)
LYMPHOCYTES RELATIVE PERCENT: 21.1 % (ref 20–40)
MCH RBC QN AUTO: 29.6 PG (ref 27–31)
MCHC RBC AUTO-ENTMCNC: 32.1 G/DL (ref 33–37)
MCV RBC AUTO: 92.3 FL (ref 81–99)
MONOCYTES ABSOLUTE: 0.6 K/UL (ref 0–0.9)
MONOCYTES RELATIVE PERCENT: 9.1 % (ref 0–10)
NEUTROPHILS ABSOLUTE: 4.5 K/UL (ref 1.5–7.5)
NEUTROPHILS RELATIVE PERCENT: 66.1 % (ref 50–65)
PDW BLD-RTO: 13.2 % (ref 11.5–14.5)
PLATELET # BLD: 212 K/UL (ref 130–400)
PMV BLD AUTO: 10.9 FL (ref 9.4–12.3)
POTASSIUM SERPL-SCNC: 4.3 MMOL/L (ref 3.5–5)
RBC # BLD: 4.42 M/UL (ref 4.2–5.4)
SODIUM BLD-SCNC: 142 MMOL/L (ref 136–145)
TOTAL PROTEIN: 6.9 G/DL (ref 6.6–8.7)
TRIGL SERPL-MCNC: 127 MG/DL (ref 0–149)
WBC # BLD: 6.8 K/UL (ref 4.8–10.8)

## 2021-02-01 ENCOUNTER — OFFICE VISIT (OUTPATIENT)
Dept: OTOLARYNGOLOGY | Facility: CLINIC | Age: 81
End: 2021-02-01

## 2021-02-01 VITALS
BODY MASS INDEX: 29.19 KG/M2 | SYSTOLIC BLOOD PRESSURE: 146 MMHG | RESPIRATION RATE: 20 BRPM | HEIGHT: 64 IN | TEMPERATURE: 98 F | WEIGHT: 171 LBS | HEART RATE: 64 BPM | DIASTOLIC BLOOD PRESSURE: 81 MMHG

## 2021-02-01 DIAGNOSIS — J30.9 ALLERGIC RHINITIS, UNSPECIFIED SEASONALITY, UNSPECIFIED TRIGGER: ICD-10-CM

## 2021-02-01 DIAGNOSIS — D03.39 MELANOMA IN SITU OF CHEEK (HCC): Primary | ICD-10-CM

## 2021-02-01 DIAGNOSIS — H69.81 DYSFUNCTION OF RIGHT EUSTACHIAN TUBE: ICD-10-CM

## 2021-02-01 DIAGNOSIS — L81.4 SOLAR LENTIGO: ICD-10-CM

## 2021-02-01 PROCEDURE — 99214 OFFICE O/P EST MOD 30 MIN: CPT | Performed by: OTOLARYNGOLOGY

## 2021-02-01 RX ORDER — FLUTICASONE PROPIONATE 50 MCG
2 SPRAY, SUSPENSION (ML) NASAL DAILY
Qty: 16 G | Refills: 11 | Status: SHIPPED | OUTPATIENT
Start: 2021-02-01 | End: 2023-02-20

## 2021-02-01 NOTE — PROGRESS NOTES
PRIMARY CARE PROVIDER: Mandy Blood MD  REFERRING PROVIDER: No ref. provider found    Chief Complaint   Patient presents with   • Follow-up     bcc / melanoma       Subjective   History of Present Illness:  Mary Cleveland is a  80 y.o. female who presents for melanoma surveillance.  Melanoma staging is [id number] s/p complete excision 11/1/2016 (Right cheek).    When seen in July, she had 3 pigmented lesions of the right cheek that were biopsied.  All these came back benign.  She denies any new lesions of the head, scalp, or neck    She has had complaints of clicking in her right ear.  This has been present for a few weeks.  Nothing makes this better, nor worse.  She denies any associated hearing loss.    Dermatologist is Dr. Franco.    Review of Systems:  Review of Systems   Constitutional: Negative for chills, fatigue, fever and unexpected weight change.   HENT: Negative for facial swelling.    Respiratory: Negative for cough, chest tightness and shortness of breath.    Cardiovascular: Negative for chest pain.   Musculoskeletal: Negative for neck pain.   Skin: Negative for color change.   Neurological: Negative for facial asymmetry.   Hematological: Negative for adenopathy. Does not bruise/bleed easily.       Past History:  Past Medical History:   Diagnosis Date   • Basal cell carcinoma of right cheek 2/8/2017   • Brow ptosis    • Dermatochalasia    • Hypertension    • Melanoma in situ of cheek (CMS/HCC)     Right Cheek   • Pseudoptosis    • Ptosis of eyelid    • Solar lentigo     Right Cheek   • Visual field defect      Past Surgical History:   Procedure Laterality Date   • BLEPHAROPLASTY Bilateral 11/1/2016    Procedure: Bilateral upper blepharoplasty with excision of excessive tissue weighing eyelids down AND excisional biopsy of right cheek lesion with permanent section analysis and linear closure.;  Surgeon: Andriy Sorenson MD;  [facility]: [facility];  Service:    • BLEPHAROPLASTY     • BREAST BIOPSY     •  CHOLECYSTECTOMY     • COLON SURGERY      RESECTION   • HYSTERECTOMY      TOTAL   • OOPHORECTOMY     • SKIN LESION EXCISION       Family History   Problem Relation Age of Onset   • Cancer Mother    • Heart failure Mother    • Cancer Father      Social History     Tobacco Use   • Smoking status: Former Smoker     Years: 41.00     Quit date:      Years since quittin.0   • Smokeless tobacco: Never Used   Substance Use Topics   • Alcohol use: No   • Drug use: No     Allergies:  Sulfa antibiotics    Current Outpatient Medications:   •  hydrochlorothiazide (MICROZIDE) 12.5 MG capsule, Take 12.5 mg by mouth Every Morning., Disp: , Rfl:   •  metoprolol tartrate (LOPRESSOR) 50 MG tablet, 1 tab daily, Disp: , Rfl:   •  tiZANidine (ZANAFLEX) 4 MG tablet, Take 4 mg by mouth., Disp: , Rfl:   •  traMADol (ULTRAM) 50 MG tablet, Take 50 mg by mouth 3 (Three) Times a Day As Needed. for pain, Disp: , Rfl: 1      Objective     Vital Signs:  Temp:  [98 °F (36.7 °C)] 98 °F (36.7 °C)  Heart Rate:  [64] 64  Resp:  [20] 20  BP: (146)/(81) 146/81    Physical Exam:  Physical Exam  Vitals signs and nursing note reviewed.   Constitutional:       General: She is not in acute distress.     Appearance: She is well-developed. She is not diaphoretic.   HENT:      Head: Normocephalic and atraumatic.      Right Ear: External ear normal.      Left Ear: External ear normal.      Nose: Nose normal.   Eyes:      General: No scleral icterus.        Right eye: No discharge.         Left eye: No discharge.      Conjunctiva/sclera: Conjunctivae normal.      Pupils: Pupils are equal, round, and reactive to light.   Neck:      Musculoskeletal: Normal range of motion and neck supple.      Thyroid: No thyromegaly.      Vascular: No JVD.      Trachea: No tracheal deviation.   Pulmonary:      Effort: Pulmonary effort is normal.      Breath sounds: No stridor.   Musculoskeletal: Normal range of motion.         General: No deformity.   Lymphadenopathy:       Cervical: No cervical adenopathy.   Skin:     General: Skin is warm and dry.      Coloration: Skin is not pale.      Findings: No erythema or rash.   Neurological:      Mental Status: She is alert and oriented to person, place, and time.      Cranial Nerves: No cranial nerve deficit.      Coordination: Coordination normal.   Psychiatric:         Speech: Speech normal.         Behavior: Behavior normal. Behavior is cooperative.         Thought Content: Thought content normal.         Judgment: Judgment normal.         Results Review:         Assessment   Assessment:  1. Melanoma in situ of cheek (CMS/HCC)    2. Solar lentigo        Plan   Plan:  No evidence of recurrent or new melanoma of the scalp, head, or neck.  For the right eustachian tube dysfunction, Flonase has been sent to her pharmacy.  Follow-up with me in November, after that she will follow up solely with Dr. Franco.    My findings and recommendations were discussed and questions were answered.     Andriy Sorenson MD  02/01/21  13:32 CST

## 2021-02-01 NOTE — PATIENT INSTRUCTIONS
CONTACT INFORMATION:  The main office phone number is [phone]. For emergencies after hours and on weekends, this number will convert over to our answering service and the on call provider will answer. Please try to keep non emergent phone calls/ questions to office hours 9am-5pm Monday through Friday.     [employer]  As an alternative, you can sign up and use the Epic MyChart system for more direct and quicker access for non emergent questions/ problems.  [facility] [employer] allows you to send messages to your doctor, view your test results, renew your prescriptions, schedule appointments, and more. To sign up, go to [employer] and click on the Sign Up Now link in the New User? box. Enter your [employer] Activation Code exactly as it appears below along with the last four digits of your Social Security Number and your Date of Birth () to complete the sign-up process. If you do not sign up before the expiration date, you must request a new code.    [employer] Activation Code: [email or web address]  Expires: 3/3/2021  1:42 PM    If you have questions, you can email [employer] [email or web address] or call [phone] to talk to our [employer] staff. Remember, [employer] is NOT to be used for urgent needs. For medical emergencies, dial 911.

## 2021-04-01 ENCOUNTER — HOSPITAL ENCOUNTER (OUTPATIENT)
Dept: CT IMAGING | Age: 81
Discharge: HOME | End: 2021-04-01
Payer: MEDICARE

## 2021-04-01 DIAGNOSIS — R41.3 MEMORY LOSS: ICD-10-CM

## 2021-04-01 PROCEDURE — 70450 CT HEAD/BRAIN W/O DYE: CPT

## 2021-06-09 LAB
ALBUMIN SERPL-MCNC: 4.2 G/DL (ref 3.5–5.2)
ALP BLD-CCNC: 81 U/L (ref 35–104)
ALT SERPL-CCNC: 15 U/L (ref 5–33)
ANION GAP SERPL CALCULATED.3IONS-SCNC: 7 MMOL/L (ref 7–19)
AST SERPL-CCNC: 16 U/L (ref 5–32)
BASOPHILS ABSOLUTE: 0.1 K/UL (ref 0–0.2)
BASOPHILS RELATIVE PERCENT: 0.7 % (ref 0–1)
BILIRUB SERPL-MCNC: 0.5 MG/DL (ref 0.2–1.2)
BUN BLDV-MCNC: 16 MG/DL (ref 8–23)
CALCIUM SERPL-MCNC: 9.2 MG/DL (ref 8.8–10.2)
CHLORIDE BLD-SCNC: 106 MMOL/L (ref 98–111)
CHOLESTEROL, TOTAL: 154 MG/DL (ref 160–199)
CO2: 30 MMOL/L (ref 22–29)
CREAT SERPL-MCNC: 0.8 MG/DL (ref 0.5–0.9)
EOSINOPHILS ABSOLUTE: 0.2 K/UL (ref 0–0.6)
EOSINOPHILS RELATIVE PERCENT: 2.9 % (ref 0–5)
GFR AFRICAN AMERICAN: >59
GFR NON-AFRICAN AMERICAN: >60
GLUCOSE BLD-MCNC: 94 MG/DL (ref 74–109)
HCT VFR BLD CALC: 37.8 % (ref 37–47)
HDLC SERPL-MCNC: 53 MG/DL (ref 65–121)
HEMOGLOBIN: 12.4 G/DL (ref 12–16)
IMMATURE GRANULOCYTES #: 0 K/UL
LDL CHOLESTEROL CALCULATED: 81 MG/DL
LYMPHOCYTES ABSOLUTE: 1.4 K/UL (ref 1.1–4.5)
LYMPHOCYTES RELATIVE PERCENT: 19 % (ref 20–40)
MCH RBC QN AUTO: 29.8 PG (ref 27–31)
MCHC RBC AUTO-ENTMCNC: 32.8 G/DL (ref 33–37)
MCV RBC AUTO: 90.9 FL (ref 81–99)
MONOCYTES ABSOLUTE: 0.6 K/UL (ref 0–0.9)
MONOCYTES RELATIVE PERCENT: 8.1 % (ref 0–10)
NEUTROPHILS ABSOLUTE: 4.9 K/UL (ref 1.5–7.5)
NEUTROPHILS RELATIVE PERCENT: 68.9 % (ref 50–65)
PDW BLD-RTO: 13.2 % (ref 11.5–14.5)
PLATELET # BLD: 227 K/UL (ref 130–400)
PMV BLD AUTO: 10.8 FL (ref 9.4–12.3)
POTASSIUM SERPL-SCNC: 4.2 MMOL/L (ref 3.5–5)
RBC # BLD: 4.16 M/UL (ref 4.2–5.4)
SODIUM BLD-SCNC: 143 MMOL/L (ref 136–145)
TOTAL PROTEIN: 6.9 G/DL (ref 6.6–8.7)
TRIGL SERPL-MCNC: 100 MG/DL (ref 0–149)
WBC # BLD: 7.2 K/UL (ref 4.8–10.8)

## 2021-11-10 ENCOUNTER — OFFICE VISIT (OUTPATIENT)
Dept: OTOLARYNGOLOGY | Facility: CLINIC | Age: 81
End: 2021-11-10

## 2021-11-10 VITALS
SYSTOLIC BLOOD PRESSURE: 156 MMHG | WEIGHT: 173 LBS | HEART RATE: 80 BPM | BODY MASS INDEX: 29.7 KG/M2 | TEMPERATURE: 98 F | DIASTOLIC BLOOD PRESSURE: 78 MMHG | RESPIRATION RATE: 20 BRPM

## 2021-11-10 DIAGNOSIS — D03.39 MELANOMA IN SITU OF CHEEK (HCC): ICD-10-CM

## 2021-11-10 DIAGNOSIS — Z08 ENCOUNTER FOR FOLLOW-UP SURVEILLANCE OF SKIN CANCER: Primary | ICD-10-CM

## 2021-11-10 DIAGNOSIS — Z85.828 ENCOUNTER FOR FOLLOW-UP SURVEILLANCE OF SKIN CANCER: Primary | ICD-10-CM

## 2021-11-10 DIAGNOSIS — C44.319 BASAL CELL CARCINOMA OF RIGHT CHEEK: ICD-10-CM

## 2021-11-10 PROCEDURE — 99212 OFFICE O/P EST SF 10 MIN: CPT | Performed by: OTOLARYNGOLOGY

## 2021-11-10 RX ORDER — MELOXICAM 7.5 MG/1
TABLET ORAL
COMMUNITY
Start: 2021-10-29

## 2021-11-10 NOTE — PATIENT INSTRUCTIONS
CONTACT INFORMATION:  The main office phone number is [phone]. For emergencies after hours and on weekends, this number will convert over to our answering service and the on call provider will answer. Please try to keep non emergent phone calls/ questions to office hours 9am-5pm Monday through Friday.     [employer]  As an alternative, you can sign up and use the Epic MyChart system for more direct and quicker access for non emergent questions/ problems.  [facility] [employer] allows you to send messages to your doctor, view your test results, renew your prescriptions, schedule appointments, and more. To sign up, go to [email or web address] and click on the Sign Up Now link in the New User? box. Enter your [employer] Activation Code exactly as it appears below along with the last four digits of your Social Security Number and your Date of Birth () to complete the sign-up process. If you do not sign up before the expiration date, you must request a new code.    [employer] Activation Code: [email or web address]  Expires: 12/10/2021 11:21 AM    If you have questions, you can email [email or web address] or call [phone] to talk to our [employer] staff. Remember, [employer] is NOT to be used for urgent needs. For medical emergencies, dial 911.

## 2021-11-10 NOTE — PROGRESS NOTES
PRIMARY CARE PROVIDER: Herb Colon MD  REFERRING PROVIDER: No ref. provider found    Chief Complaint   Patient presents with   • Follow-up     bcc / melanoma       Subjective   History of Present Illness:  Mary Cleveland is a  81 y.o. female who presents for melanoma surveillance.  Melanoma staging is [id number] s/p complete excision 11/1/2016 (Right cheek).    When seen in July, she had 3 pigmented lesions of the right cheek that were biopsied.  All these came back benign.  She denies any new lesions of the head, scalp, or neck.    Initial melanoma was removed as an excisional biopsy during a bilateral upper blepharoplasty on November 1, 2016.  The malignant melanoma in situ was closest 1.7 mm from the margin.    She denies any new, or suspicious lesions of the scalp, face, or neck.    Dermatologist is Dr. Franco.    Review of Systems:  Review of Systems   Constitutional: Negative for chills, fatigue, fever and unexpected weight change.   HENT: Negative for facial swelling.    Respiratory: Negative for cough, chest tightness and shortness of breath.    Cardiovascular: Negative for chest pain.   Musculoskeletal: Negative for neck pain.   Skin: Negative for color change.   Neurological: Negative for facial asymmetry.   Hematological: Negative for adenopathy. Does not bruise/bleed easily.       Past History:  Past Medical History:   Diagnosis Date   • Basal cell carcinoma of right cheek 2/8/2017   • Brow ptosis    • Dermatochalasia    • Hypertension    • Melanoma in situ of cheek (HCC)     Right Cheek   • Pseudoptosis    • Ptosis of eyelid    • Solar lentigo     Right Cheek   • Visual field defect      Past Surgical History:   Procedure Laterality Date   • BLEPHAROPLASTY Bilateral 11/1/2016    Procedure: Bilateral upper blepharoplasty with excision of excessive tissue weighing eyelids down AND excisional biopsy of right cheek lesion with permanent section analysis and linear closure.;  Surgeon: Andriy Sorenson MD;   Location: [facility] OR;  Service:    • BLEPHAROPLASTY     • BREAST BIOPSY     • CHOLECYSTECTOMY     • COLON SURGERY      RESECTION   • HYSTERECTOMY      TOTAL   • OOPHORECTOMY     • SKIN LESION EXCISION       Family History   Problem Relation Age of Onset   • Cancer Mother    • Heart failure Mother    • Cancer Father      Social History     Tobacco Use   • Smoking status: Former Smoker     Years: 41.00     Quit date:      Years since quittin.8   • Smokeless tobacco: Never Used   Substance Use Topics   • Alcohol use: No   • Drug use: No     Allergies:  Sulfa antibiotics    Current Outpatient Medications:   •  fluticasone (FLONASE) 50 MCG/ACT nasal spray, 2 sprays into the nostril(s) as directed by provider Daily., Disp: 16 g, Rfl: 11  •  hydrochlorothiazide (MICROZIDE) 12.5 MG capsule, Take 12.5 mg by mouth Every Morning., Disp: , Rfl:   •  meloxicam (MOBIC) 7.5 MG tablet, , Disp: , Rfl:   •  metoprolol tartrate (LOPRESSOR) 50 MG tablet, 1 tab daily, Disp: , Rfl:   •  tiZANidine (ZANAFLEX) 4 MG tablet, Take 4 mg by mouth., Disp: , Rfl:   •  traMADol (ULTRAM) 50 MG tablet, Take 50 mg by mouth 3 (Three) Times a Day As Needed. for pain, Disp: , Rfl: 1      Objective     Vital Signs:  Temp:  [98 °F (36.7 °C)] 98 °F (36.7 °C)  Resp:  [20] 20    Physical Exam:  Physical Exam  Vitals and nursing note reviewed.   Constitutional:       General: She is not in acute distress.     Appearance: She is well-developed. She is not diaphoretic.   HENT:      Head: Normocephalic and atraumatic.      Right Ear: External ear normal.      Left Ear: External ear normal.      Nose: Nose normal.   Eyes:      General: No scleral icterus.        Right eye: No discharge.         Left eye: No discharge.      Conjunctiva/sclera: Conjunctivae normal.      Pupils: Pupils are equal, round, and reactive to light.   Neck:      Thyroid: No thyromegaly.      Vascular: No JVD.      Trachea: No tracheal deviation.   Pulmonary:      Effort: Pulmonary  effort is normal.      Breath sounds: No stridor.   Musculoskeletal:         General: No deformity. Normal range of motion.      Cervical back: Normal range of motion and neck supple.   Lymphadenopathy:      Cervical: No cervical adenopathy.   Skin:     General: Skin is warm and dry.      Coloration: Skin is not pale.      Findings: No erythema or rash.   Neurological:      Mental Status: She is alert and oriented to person, place, and time.      Cranial Nerves: No cranial nerve deficit.      Coordination: Coordination normal.   Psychiatric:         Speech: Speech normal.         Behavior: Behavior normal. Behavior is cooperative.         Thought Content: Thought content normal.         Judgment: Judgment normal.     Demonstrates no suspicious lesions of the right cheek or nasal area.    Results Review:   I have reviewed her pathology history.  She has had biopsies of the right cheek on August 1, 2017, August 13, 2018, and 3 on July 29, 2020.  All were negative for melena.      Assessment   Assessment:  1. Encounter for follow-up surveillance of skin cancer    2. Basal cell carcinoma of right cheek    3. Melanoma in situ of cheek (HCC)        Plan   Plan:  No evidence of recurrent or new melanoma of the scalp, head, or neck.  She has routine follow-up scheduled with Dr. Franco.  Follow-up with me as needed.  My findings and recommendations were discussed and questions were answered.     Andriy Sorenson MD  11/10/21  11:17 CST

## 2022-05-02 ENCOUNTER — TRANSCRIBE ORDERS (OUTPATIENT)
Dept: ADMINISTRATIVE | Facility: HOSPITAL | Age: 82
End: 2022-05-02

## 2022-05-02 DIAGNOSIS — R91.1 LUNG NODULE: Primary | ICD-10-CM

## 2022-06-13 ENCOUNTER — HOSPITAL ENCOUNTER (OUTPATIENT)
Dept: CT IMAGING | Facility: HOSPITAL | Age: 82
Discharge: HOME | End: 2022-06-13
Admitting: FAMILY MEDICINE

## 2022-06-13 DIAGNOSIS — R91.1 LUNG NODULE: ICD-10-CM

## 2022-06-13 PROCEDURE — 71250 CT THORAX DX C-: CPT

## 2022-06-17 ENCOUNTER — TRANSCRIBE ORDERS (OUTPATIENT)
Dept: ADMINISTRATIVE | Facility: HOSPITAL | Age: 82
End: 2022-06-17

## 2022-06-17 DIAGNOSIS — R13.19 OTHER DYSPHAGIA: Primary | ICD-10-CM

## 2022-06-24 ENCOUNTER — APPOINTMENT (OUTPATIENT)
Dept: GENERAL RADIOLOGY | Facility: HOSPITAL | Age: 82
End: 2022-06-24

## 2022-07-20 ENCOUNTER — OFFICE VISIT (OUTPATIENT)
Dept: OTOLARYNGOLOGY | Facility: CLINIC | Age: 82
End: 2022-07-20

## 2022-07-20 VITALS — HEART RATE: 70 BPM | SYSTOLIC BLOOD PRESSURE: 143 MMHG | DIASTOLIC BLOOD PRESSURE: 73 MMHG | TEMPERATURE: 97.4 F

## 2022-07-20 DIAGNOSIS — Z85.828 ENCOUNTER FOR FOLLOW-UP SURVEILLANCE OF SKIN CANCER: Primary | ICD-10-CM

## 2022-07-20 DIAGNOSIS — Z08 ENCOUNTER FOR FOLLOW-UP SURVEILLANCE OF SKIN CANCER: Primary | ICD-10-CM

## 2022-07-20 DIAGNOSIS — D48.5 NEOPLASM OF UNCERTAIN BEHAVIOR OF SKIN: ICD-10-CM

## 2022-07-20 DIAGNOSIS — D03.39 MELANOMA IN SITU OF CHEEK: ICD-10-CM

## 2022-07-20 PROCEDURE — 88305 TISSUE EXAM BY PATHOLOGIST: CPT | Performed by: OTOLARYNGOLOGY

## 2022-07-20 PROCEDURE — 11104 PUNCH BX SKIN SINGLE LESION: CPT | Performed by: OTOLARYNGOLOGY

## 2022-07-20 PROCEDURE — 99213 OFFICE O/P EST LOW 20 MIN: CPT | Performed by: OTOLARYNGOLOGY

## 2022-07-20 RX ORDER — OMEPRAZOLE 20 MG/1
CAPSULE, DELAYED RELEASE ORAL
COMMUNITY
Start: 2022-07-15

## 2022-07-20 NOTE — PROGRESS NOTES
PRIMARY CARE PROVIDER: Herb Colon MD  REFERRING PROVIDER: No ref. provider found    Chief Complaint   Patient presents with   • Skin Lesion     Skin lesion to nose  Head and neck skin cancer surveillance       Subjective   History of Present Illness:  Mary Cleveland is a  81 y.o. female who presents with a new lesion of her nose and for melanoma surveillance.  Melanoma staging is [id number] s/p complete excision 11/1/2016 (Right cheek).  There was also in incidental basal cell carcinoma (superficial) in the specimine with clear margins.    When seen in July, she had 3 pigmented lesions of the right cheek that were biopsied.  All these came back benign.  She now describes a new lesion of the nasal tip area:    Quality: raised, fluid-filled lesion that got as big as a pencil eraser (6mm)  Severity: moderate  Duration: 3 weeks  Timing: resolving  Modifying Factors: none  Associated Signs & Symptoms: did not drain, but spontaneously has been getting better.  No pain or drainage.    Her initial melanoma was removed as an excisional biopsy as a separate procedure during a bilateral upper blepharoplasty on November 1, 2016.  The malignant melanoma in situ was closest 1.7 mm from the margin.    Dermatologist is Dr. Franco.    Review of Systems:  Review of Systems   Constitutional: Negative for chills, fatigue, fever and unexpected weight change.   HENT: Negative for facial swelling.    Respiratory: Negative for cough, chest tightness and shortness of breath.    Cardiovascular: Negative for chest pain.   Musculoskeletal: Negative for neck pain.   Skin: Negative for color change.   Neurological: Negative for facial asymmetry.   Hematological: Negative for adenopathy. Does not bruise/bleed easily.       Past History:  Past Medical History:   Diagnosis Date   • Basal cell carcinoma of right cheek 2/8/2017   • Brow ptosis    • Dermatochalasia    • Hypertension    • Melanoma in situ of cheek (HCC)     Right Cheek   •  Pseudoptosis    • Ptosis of eyelid    • Solar lentigo     Right Cheek   • Visual field defect      Past Surgical History:   Procedure Laterality Date   • BLEPHAROPLASTY Bilateral 2016    Procedure: Bilateral upper blepharoplasty with excision of excessive tissue weighing eyelids down AND excisional biopsy of right cheek lesion with permanent section analysis and linear closure.;  Surgeon: Andriy Sorenson MD;  Location: [facility];  Service:    • BLEPHAROPLASTY     • BREAST BIOPSY     • CHOLECYSTECTOMY     • COLON SURGERY      RESECTION   • COLONOSCOPY     • HYSTERECTOMY      TOTAL   • OOPHORECTOMY     • SKIN LESION EXCISION       Family History   Problem Relation Age of Onset   • Cancer Mother    • Heart failure Mother    • Cancer Father      Social History     Tobacco Use   • Smoking status: Former Smoker     Years: 41.00     Quit date:      Years since quittin.5   • Smokeless tobacco: Never Used   Substance Use Topics   • Alcohol use: No   • Drug use: No     Allergies:  Sulfa antibiotics    Current Outpatient Medications:   •  fluticasone (FLONASE) 50 MCG/ACT nasal spray, 2 sprays into the nostril(s) as directed by provider Daily., Disp: 16 g, Rfl: 11  •  hydrochlorothiazide (MICROZIDE) 12.5 MG capsule, Take 12.5 mg by mouth Every Morning., Disp: , Rfl:   •  meloxicam (MOBIC) 7.5 MG tablet, , Disp: , Rfl:   •  metoprolol tartrate (LOPRESSOR) 50 MG tablet, 1 tab daily, Disp: , Rfl:   •  omeprazole (priLOSEC) 20 MG capsule, TAKE 1 CAPSULE BY MOUTH 30 MINUTES BEFORE MORNING MEAL ONCE DAILY, Disp: , Rfl:   •  tiZANidine (ZANAFLEX) 4 MG tablet, Take 4 mg by mouth., Disp: , Rfl:   •  traMADol (ULTRAM) 50 MG tablet, Take 50 mg by mouth 3 (Three) Times a Day As Needed. for pain, Disp: , Rfl: 1      Objective     Vital Signs:  Temp:  [97.4 °F (36.3 °C)] 97.4 °F (36.3 °C)  Heart Rate:  [70] 70  BP: (143)/(73) 143/73    Physical Exam:  Physical Exam  Vitals and nursing note reviewed.   Constitutional:        General: She is not in acute distress.     Appearance: She is well-developed. She is not diaphoretic.   HENT:      Head: Normocephalic and atraumatic.      Right Ear: External ear normal.      Left Ear: External ear normal.      Nose: Nose normal.     Eyes:      General: No scleral icterus.        Right eye: No discharge.         Left eye: No discharge.      Conjunctiva/sclera: Conjunctivae normal.      Pupils: Pupils are equal, round, and reactive to light.   Neck:      Thyroid: No thyromegaly.      Vascular: No JVD.      Trachea: No tracheal deviation.   Pulmonary:      Effort: Pulmonary effort is normal.      Breath sounds: No stridor.   Musculoskeletal:         General: No deformity. Normal range of motion.      Cervical back: Normal range of motion and neck supple.   Lymphadenopathy:      Cervical: No cervical adenopathy.   Skin:     General: Skin is warm and dry.      Coloration: Skin is not pale.      Findings: No erythema or rash.   Neurological:      Mental Status: She is alert and oriented to person, place, and time.      Cranial Nerves: No cranial nerve deficit.      Coordination: Coordination normal.   Psychiatric:         Speech: Speech normal.         Behavior: Behavior normal. Behavior is cooperative.         Thought Content: Thought content normal.         Judgment: Judgment normal.       Assessment   Assessment:  1. Encounter for follow-up surveillance of skin cancer    2. Neoplasm of uncertain behavior of skin    3. Melanoma in situ of cheek (HCC)        Plan   Plan:  Plan for punch biopsy.  Will call with pathology results and plan.  If basal cell carcinoma, likely small bilobe flap.  She would like this performed in the operating room.    Andriy Sorenson MD  07/20/22  10:00 CDT

## 2022-07-20 NOTE — PROGRESS NOTES
Preprocedure diagnosis: Clinically suspicious for basal cell carcinoma of the nasal tip    Post procedure diagnosis: Same    Procedure: Punch biopsy    Details:  Patient consent was obtained.  The skin was cleansed with alcohol.  The skin was infiltrated with 1 mL of 1% lidocaine with 1-100,000 epinephrine.  After approximately 10 minutes, a 3 millimeter punch biopsy was taken by placing circular motion on the biopsy tool, the skin was elevated and clipped with iris scissors.  The specimen was sent in formalin.  The skin was closed using a simple 5-0 fast absorbing gut suture.  Antibiotic ointment was placed over the biopsy site.  The patient tolerated the procedure with minimal discomfort.    Andriy Sorenson MD  07/20/22  10:12 CDT

## 2022-08-01 LAB
CYTO UR: NORMAL
LAB AP CASE REPORT: NORMAL
LAB AP CLINICAL INFORMATION: NORMAL
LAB AP DIAGNOSIS COMMENT: NORMAL
Lab: NORMAL
PATH REPORT.FINAL DX SPEC: NORMAL
PATH REPORT.GROSS SPEC: NORMAL

## 2022-08-04 ENCOUNTER — TELEPHONE (OUTPATIENT)
Dept: OTOLARYNGOLOGY | Facility: CLINIC | Age: 82
End: 2022-08-04

## 2023-02-20 ENCOUNTER — HOSPITAL ENCOUNTER (OUTPATIENT)
Dept: GENERAL RADIOLOGY | Facility: HOSPITAL | Age: 83
Discharge: HOME | End: 2023-02-20
Payer: MEDICARE

## 2023-02-20 PROCEDURE — 71046 X-RAY EXAM CHEST 2 VIEWS: CPT

## 2023-05-11 ENCOUNTER — TRANSCRIBE ORDERS (OUTPATIENT)
Dept: ADMINISTRATIVE | Facility: HOSPITAL | Age: 83
End: 2023-05-11
Payer: MEDICARE

## 2023-05-11 DIAGNOSIS — R91.1 LUNG NODULE: Primary | ICD-10-CM

## 2023-06-07 ENCOUNTER — HOSPITAL ENCOUNTER (OUTPATIENT)
Dept: CT IMAGING | Facility: HOSPITAL | Age: 83
Discharge: HOME | End: 2023-06-07
Admitting: FAMILY MEDICINE
Payer: MEDICARE

## 2023-06-07 DIAGNOSIS — R91.1 LUNG NODULE: ICD-10-CM

## 2023-06-07 PROCEDURE — 71250 CT THORAX DX C-: CPT

## 2024-08-15 ENCOUNTER — TRANSCRIBE ORDERS (OUTPATIENT)
Dept: ADMINISTRATIVE | Facility: HOSPITAL | Age: 84
End: 2024-08-15
Payer: MEDICARE

## 2024-08-15 DIAGNOSIS — R41.3 OTHER AMNESIA: Primary | ICD-10-CM

## 2024-09-18 ENCOUNTER — HOSPITAL ENCOUNTER (OUTPATIENT)
Dept: MRI IMAGING | Facility: HOSPITAL | Age: 84
Discharge: HOME | End: 2024-09-18
Admitting: FAMILY MEDICINE
Payer: MEDICARE

## 2024-09-18 DIAGNOSIS — R41.3 OTHER AMNESIA: ICD-10-CM

## 2024-09-18 PROCEDURE — 70551 MRI BRAIN STEM W/O DYE: CPT

## 2024-10-07 ENCOUNTER — TELEPHONE (OUTPATIENT)
Dept: NEUROSURGERY | Facility: CLINIC | Age: 84
End: 2024-10-07
Payer: MEDICARE

## 2024-10-07 NOTE — TELEPHONE ENCOUNTER
I have called cell phone and no VM set up.    I have called home phone and has been disconnected.

## 2024-10-10 ENCOUNTER — OFFICE VISIT (OUTPATIENT)
Dept: NEUROSURGERY | Facility: CLINIC | Age: 84
End: 2024-10-10
Payer: MEDICARE

## 2024-10-10 VITALS — HEIGHT: 64 IN | BODY MASS INDEX: 29.21 KG/M2 | WEIGHT: 171.13 LBS

## 2024-10-10 DIAGNOSIS — R41.3 GRADUAL-ONSET MEMORY IMPAIRMENT: ICD-10-CM

## 2024-10-10 DIAGNOSIS — E66.3 OVERWEIGHT WITH BODY MASS INDEX (BMI) OF 29 TO 29.9 IN ADULT: ICD-10-CM

## 2024-10-10 DIAGNOSIS — I62.00 SUBDURAL HEMORRHAGE: Primary | ICD-10-CM

## 2024-10-10 PROCEDURE — 1160F RVW MEDS BY RX/DR IN RCRD: CPT | Performed by: NURSE PRACTITIONER

## 2024-10-10 PROCEDURE — 1159F MED LIST DOCD IN RCRD: CPT | Performed by: NURSE PRACTITIONER

## 2024-10-10 PROCEDURE — 99204 OFFICE O/P NEW MOD 45 MIN: CPT | Performed by: NURSE PRACTITIONER

## 2024-10-10 RX ORDER — DONEPEZIL HYDROCHLORIDE 10 MG/1
TABLET, FILM COATED ORAL
COMMUNITY

## 2024-10-10 NOTE — PATIENT INSTRUCTIONS
"DASH Eating Plan  DASH stands for Dietary Approaches to Stop Hypertension. The DASH eating plan is a healthy eating plan that has been shown to:  Lower high blood pressure (hypertension).  Reduce your risk for type 2 diabetes, heart disease, and stroke.  Help with weight loss.  What are tips for following this plan?  Reading food labels  Check food labels for the amount of salt (sodium) per serving. Choose foods with less than 5 percent of the Daily Value (DV) of sodium. In general, foods with less than 300 milligrams (mg) of sodium per serving fit into this eating plan.  To find whole grains, look for the word \"whole\" as the first word in the ingredient list.  Shopping  Buy products labeled as \"low-sodium\" or \"no salt added.\"  Buy fresh foods. Avoid canned foods and pre-made or frozen meals.  Cooking  Try not to add salt when you cook. Use salt-free seasonings or herbs instead of table salt or sea salt. Check with your health care provider or pharmacist before using salt substitutes.  Do not gómez foods. Cook foods in healthy ways, such as baking, boiling, grilling, roasting, or broiling.  Cook using oils that are good for your heart. These include olive, canola, avocado, soybean, and sunflower oil.  Meal planning    Eat a balanced diet. This should include:  4 or more servings of fruits and 4 or more servings of vegetables each day. Try to fill half of your plate with fruits and vegetables.  6-8 servings of whole grains each day.  6 or less servings of lean meat, poultry, or fish each day. 1 oz is 1 serving. A 3 oz (85 g) serving of meat is about the same size as the palm of your hand. One egg is 1 oz (28 g).  2-3 servings of low-fat dairy each day. One serving is 1 cup (237 mL).  1 serving of nuts, seeds, or beans 5 times each week.  2-3 servings of heart-healthy fats. Healthy fats called omega-3 fatty acids are found in foods such as walnuts, flaxseeds, fortified milks, and eggs. These fats are also found in " cold-water fish, such as sardines, salmon, and mackerel.  Limit how much you eat of:  Canned or prepackaged foods.  Food that is high in trans fat, such as fried foods.  Food that is high in saturated fat, such as fatty meat.  Desserts and other sweets, sugary drinks, and other foods with added sugar.  Full-fat dairy products.  Do not salt foods before eating.  Do not eat more than 4 egg yolks a week.  Try to eat at least 2 vegetarian meals a week.  Eat more home-cooked food and less restaurant, buffet, and fast food.  Lifestyle  When eating at a restaurant, ask if your food can be made with less salt or no salt.  If you drink alcohol:  Limit how much you have to:  0-1 drink a day if you are female.  0-2 drinks a day if you are male.  Know how much alcohol is in your drink. In the [place] one drink is one 12 oz bottle of beer (355 mL), one 5 oz glass of wine (148 mL), or one 1½ oz glass of hard liquor (44 mL).  General information  Avoid eating more than 2,300 mg of salt a day. If you have hypertension, you may need to reduce your sodium intake to 1,500 mg a day.  Work with your provider to stay at a healthy body weight or lose weight. Ask what the best weight range is for you.  On most days of the week, get at least 30 minutes of exercise that causes your heart to beat faster. This may include walking, swimming, or biking.  Work with your provider or dietitian to adjust your eating plan to meet your specific calorie needs.  What foods should I eat?  Fruits  All fresh, dried, or frozen fruit. Canned fruits that are in their natural juice and do not have sugar added to them.  Vegetables  Fresh or frozen vegetables that are raw, steamed, roasted, or grilled. Low-sodium or reduced-sodium tomato and vegetable juice. Low-sodium or reduced-sodium tomato sauce and tomato paste. Low-sodium or reduced-sodium canned vegetables.  Grains  Whole-grain or whole-wheat bread. Whole-grain or whole-wheat pasta. Brown rice. Oatmeal.  Quinoa. Bulgur. Whole-grain and low-sodium cereals. Kalee bread. Low-fat, low-sodium crackers. Whole-wheat flour tortillas.  Meats and other proteins  Skinless chicken or turkey. Ground chicken or turkey. Pork with fat trimmed off. Fish and seafood. Egg whites. Dried beans, peas, or lentils. Unsalted nuts, nut butters, and seeds. Unsalted canned beans. Lean cuts of beef with fat trimmed off. Low-sodium, lean precooked or cured meat, such as sausages or meat loaves.  Dairy  Low-fat (1%) or fat-free (skim) milk. Reduced-fat, low-fat, or fat-free cheeses. Nonfat, low-sodium ricotta or cottage cheese. Low-fat or nonfat yogurt. Low-fat, low-sodium cheese.  Fats and oils  Soft margarine without trans fats. Vegetable oil. Reduced-fat, low-fat, or light mayonnaise and salad dressings (reduced-sodium). Canola, safflower, olive, avocado, soybean, and sunflower oils. Avocado.  Seasonings and condiments  Herbs. Spices. Seasoning mixes without salt.  Other foods  Unsalted popcorn and pretzels. Fat-free sweets.  The items listed above may not be all the foods and drinks you can have. Talk to a dietitian to learn more.  What foods should I avoid?  Fruits  Canned fruit in a light or heavy syrup. Fried fruit. Fruit in cream or butter sauce.  Vegetables  Creamed or fried vegetables. Vegetables in a cheese sauce. Regular canned vegetables that are not marked as low-sodium or reduced-sodium. Regular canned tomato sauce and paste that are not marked as low-sodium or reduced-sodium. Regular tomato and vegetable juices that are not marked as low-sodium or reduced-sodium. Pickles. Olives.  Grains  Baked goods made with fat, such as croissants, muffins, or some breads. Dry pasta or rice meal packs.  Meats and other proteins  Fatty cuts of meat. Ribs. Fried meat. Christian. Bologna, salami, and other precooked or cured meats, such as sausages or meat loaves, that are not lean and low in sodium. Fat from the back of a pig (fatback). [place].  Salted nuts and seeds. Canned beans with added salt. Canned or smoked fish. Whole eggs or egg yolks. Chicken or turkey with skin.  Dairy  Whole or 2% milk, cream, and half-and-half. Whole or full-fat cream cheese. Whole-fat or sweetened yogurt. Full-fat cheese. Nondairy creamers. Whipped toppings. Processed cheese and cheese spreads.  Fats and oils  Butter. Stick margarine. Lard. Shortening. Ghee. Christian fat. Tropical oils, such as coconut, palm kernel, or palm oil.  Seasonings and condiments  Onion salt, garlic salt, seasoned salt, table salt, and sea salt. Worcestershire sauce. Tartar sauce. Barbecue sauce. Teriyaki sauce. Soy sauce, including reduced-sodium soy sauce. Steak sauce. Canned and packaged gravies. Fish sauce. Oyster sauce. Cocktail sauce. Store-bought horseradish. Ketchup. Mustard. Meat flavorings and tenderizers. Bouillon cubes. Hot sauces. Pre-made or packaged marinades. Pre-made or packaged taco seasonings. Relishes. Regular salad dressings.  Other foods  Salted popcorn and pretzels.  The items listed above may not be all the foods and drinks you should avoid. Talk to a dietitian to learn more.  Where to find more information  National Heart, Lung, and Blood [place] (NHLBI): nhlbi.nih.gov  American Heart Association (AHA): heart.org  Academy of Nutrition and Dietetics: eatright.org  National Kidney Foundation (NKF): kidney.org  This information is not intended to replace advice given to you by your health care provider. Make sure you discuss any questions you have with your health care provider.  Document Revised: 01/04/2024 Document Reviewed: 01/04/2024  Elsevier Patient Education © 2024 [employer]

## 2024-10-10 NOTE — PROGRESS NOTES
Primary Care Provider: Herb Colon MD    Chief Complaint:   Chief Complaint   Patient presents with    SDH     New patient here for evaluation.     History of Present Illness    HPI  Mary Cleveland is a 83 y.o. female whom presents today with her  and daughter for evaluation of abnormal MRI brain performed on 9/18/2024 as ordered by her PCP for complaints of a progressive decline in her short-term memory.    Ms. Cleveland has a high school diploma and completed several years of college before becoming .  She has not driven in many years.      Over the past year, family states Mary has shown signs of a progressive decline in her short-term memory, requiring recurrent reminders of conversations and/or task that need to be completed.  Family denies noticing deficits in her long-term memory.  She is capable of performing household duties such as cooking and cleaning without complication.  Until the first of the year, she wrote checks paid her family's bills.  In January, Ms. Cleveland and her spouses insurance lapsed due to failure/forgetfulness to pay.  Her nephew has since started paying their bills and managing their finances.  In addition to her short-term memory, family additionally states they have noticed intermittent frustration associated with difficulty with word finding.  She was recently started on Aricept by her PCP    2-3 months ago, Ms. Cleveland tripped on a step walking while into her kitchen, falling to the ground and landing on her right shoulder.  Her fall was witnessed by her  whom states she did not hit her head or lose consciousness.  She has had no additional falls and does not require assistance to ambulate.  She denies a past or remote history of head trauma.  She is not prescribed nor taking antiplatelet or anticoagulant.  Mary additionally denies dizziness, visual disturbances, headaches, facial asymmetry or dysesthesias, unilateral weakness, difficulty with gait or  balance, nausea, and vomiting, or bowel bladder incontinence.  She currently rates the severity of her symptoms  0 /10.       ROS  Review of Systems   Constitutional:  Positive for fatigue.   HENT: Negative.     Eyes: Negative.    Respiratory: Negative.     Cardiovascular: Negative.    Gastrointestinal: Negative.    Endocrine: Negative.    Genitourinary:  Positive for urgency.   Musculoskeletal: Negative.    Skin: Negative.    Allergic/Immunologic: Negative.    Neurological: Negative.    Hematological: Negative.    Psychiatric/Behavioral:  Positive for confusion, decreased concentration and sleep disturbance. The patient is nervous/anxious.    All other systems reviewed and are negative.    Past Medical History:   Diagnosis Date    Basal cell carcinoma of right cheek 2/8/2017    Brow ptosis     Dermatochalasia     Hypertension     Melanoma in situ of cheek     Right Cheek    Pseudoptosis     Ptosis of eyelid     Solar lentigo     Right Cheek    Visual field defect      Past Surgical History:   Procedure Laterality Date    BLEPHAROPLASTY Bilateral 11/01/2016    Procedure: Bilateral upper blepharoplasty with excision of excessive tissue weighing eyelids down AND excisional biopsy of right cheek lesion with permanent section analysis and linear closure.;  Surgeon: Andriy Sorenson MD;  Location: [facility] OR;  Service:     BLEPHAROPLASTY      BREAST BIOPSY      CHOLECYSTECTOMY      COLON SURGERY      RESECTION    COLONOSCOPY      HYSTERECTOMY      TOTAL    OOPHORECTOMY      SKIN LESION EXCISION       Family History: family history includes Cancer in her father and mother; Heart failure in her mother.    Social History:  reports that she quit smoking about 20 years ago. Her smoking use included cigarettes. She started smoking about 61 years ago. She has never been exposed to tobacco smoke. She has never used smokeless tobacco. She reports that she does not drink alcohol and does not use drugs.    Medications:    Current  "Outpatient Medications:     donepezil (ARICEPT) 10 MG tablet, TAKE 1 TABLET BY MOUTH IN THE MORNING FOR MEMORY FOR 90 DAYS, Disp: , Rfl:     hydrochlorothiazide (MICROZIDE) 12.5 MG capsule, Take 1 capsule by mouth Every Morning., Disp: , Rfl:     amoxicillin-clavulanate (Augmentin) 875-125 MG per tablet, Take 1 tablet by mouth Every 12 (Twelve) Hours. (Patient not taking: Reported on 10/10/2024), Disp: 20 tablet, Rfl: 0    azithromycin (ZITHROMAX) 250 MG tablet, Take 2 tablets by mouth the first day, then 1 tablet daily for more 4 days. (Patient not taking: Reported on 10/10/2024), Disp: 6 tablet, Rfl: 0    meloxicam (MOBIC) 7.5 MG tablet, , Disp: , Rfl:     metoprolol tartrate (LOPRESSOR) 50 MG tablet, 1 tab daily (Patient not taking: Reported on 10/10/2024), Disp: , Rfl:     omeprazole (priLOSEC) 20 MG capsule, TAKE 1 CAPSULE BY MOUTH 30 MINUTES BEFORE MORNING MEAL ONCE DAILY (Patient not taking: Reported on 10/10/2024), Disp: , Rfl:     Allergies:  Sulfa antibiotics    Objective   Ht 162.6 cm (64\")   Wt 77.6 kg (171 lb 2 oz)   BMI 29.37 kg/m²   Physical Exam  Vitals and nursing note reviewed.   Constitutional:       General: She is not in acute distress.     Appearance: Normal appearance. She is well-developed, well-groomed and overweight. She is not ill-appearing, toxic-appearing or diaphoretic.      Comments: BMI 29.37   HENT:      Head: Normocephalic and atraumatic.      Right Ear: Hearing normal.      Left Ear: Hearing normal.   Eyes:      General: Lids are normal.      Extraocular Movements: Extraocular movements intact.      Conjunctiva/sclera: Conjunctivae normal.      Pupils: Pupils are equal, round, and reactive to light.   Neck:      Trachea: Trachea normal.   Cardiovascular:      Rate and Rhythm: Normal rate and regular rhythm.   Pulmonary:      Effort: Pulmonary effort is normal. No tachypnea, bradypnea, accessory muscle usage or respiratory distress.   Abdominal:      Palpations: Abdomen is soft. "   Musculoskeletal:      Cervical back: Full passive range of motion without pain and neck supple.   Skin:     General: Skin is warm and dry.   Neurological:      GCS: GCS eye subscore is 4. GCS verbal subscore is 5. GCS motor subscore is 6.      Coordination: Coordination is intact.      Deep Tendon Reflexes:      Reflex Scores:       Tricep reflexes are 1+ on the right side and 1+ on the left side.       Bicep reflexes are 1+ on the right side and 1+ on the left side.       Brachioradialis reflexes are 1+ on the right side and 1+ on the left side.       Patellar reflexes are 1+ on the right side and 1+ on the left side.       Achilles reflexes are 1+ on the right side and 1+ on the left side.  Psychiatric:         Speech: Speech normal.         Behavior: Behavior normal. Behavior is cooperative.       Neurological Exam  Mental Status  Awake, alert and oriented to person, place and time. Speech is normal. Able to name objects. Attention and concentration are normal.  See MMSE.    Cranial Nerves  CN I: Sense of smell is normal.  CN II: Visual acuity is normal.  CN III, IV, VI: Extraocular movements intact bilaterally. Normal lids and orbits bilaterally. Pupils equal round and reactive to light bilaterally.  CN V: Facial sensation is normal.  CN VII: Full and symmetric facial movement.  CN IX, X: Palate elevates symmetrically  CN XI: Shoulder shrug strength is normal.  CN XII: Tongue midline without atrophy or fasciculations.    Motor  Normal muscle bulk throughout. Normal muscle tone. No pronator drift.    Sensory  Sensation is intact to light touch, pinprick, vibration and proprioception in all four extremities.    Reflexes                                            Right                      Left  Brachioradialis                    1+                         1+  Biceps                                 1+                         1+  Triceps                                1+                         1+  Patellar         "                        1+                         1+  Achilles                                1+                         1+  Right Plantar: downgoing  Left Plantar: downgoing    Right pathological reflexes: Rj's absent. Ankle clonus absent.  Left pathological reflexes: Rj's absent. Ankle clonus absent.    Coordination    Finger-to-nose, rapid alternating movements and heel-to-shin normal bilaterally without dysmetria.    Gait  Casual gait is normal including stance, stride, and arm swing.  Normal gait and arm sway.  No evidence of en bloc turning..    MAX SCORE  PATIENT'S SCORE  QUESTION   5 4 What is the year?  Season?  Date?  Day of the week?  Month?     5 5 Where we now: State?  County?  Town/city?  Hospital?  Floor?     3 3 The examiner names 3 unrelated objects clearly and slowly, then asked the patient to name all 3 of them.  The patient's response is used for scoring.  The examiner repeats them until patient learns all of them, if possible.  Number of trials: 3     5 5 I would like you to count backwards from 100 by sevens (93, 86, 79, 72, 65,) is.  Stop her 5 answers.  Spell world backwards open.  (DLROW)     3 0 Earlier I told you the name of 3 things.  Can you please tell me what those were?     2 2 Show the patient to simple objects, such as a wristwatch and a pencil, and asked the patient to name them.     1 0 Repeat the phrase: No if's, hands, or but's.     3 3 Take the paper in her right hand, folded in half, and put on the floor.  (The examiner gives the patient a piece of blank paper.)     1 1 Please read this and do it is as.  (Written instructions is \"close your eyes.\")     1 1 makeup and write a sentence about anything.  (Sentence must contain a noun and a verb)     1 1 Please copy this picture.  (The examiner gives the patient a blank piece of paper and asked him/her to draw the simple below.  All 10 angles must be present and to miss intersect.)     30 25 TOTAL      METHOD  SCORE  " INTERPRETATION   Single cutoff < 24 Abnormal   Range  <21  >25  Increased odds of dementia  Decreased odds of dementia   Education  21  <23  <24 Abnormal for 8th grade education  Abnormal for high school education  Abnormal for college education   Severity  24-30  18-23  0-17 No cognitive impairment  Mild cognitive impairment  Severe cognitive impairment     MMSE:  The Mini Mental State Examination (MMSE) is a tool that can be used to systematically and thoroughly assess mental status with older, community dwelling, hospitalized and institutionalized adults. It is an 11-question measure that tests five areas of cognitive function:  orientation, registration, attention and calculation, recall, and language. The maximum score is 30. A score of 23 or lower is indicative of cognitive impairment.     Imaging: (independent review and interpretation)  9/18/2024.  3 mm subdural hemorrhage along the left parieto-occipital convexity, no midline shift or mass effect      ASSESSMENT and PLAN  Mary Cleveland is a 83 y.o. female with significant medical comorbidities to include skin cancer, hypertension, and she is overweight.  She presents with a new problem of 1 year onset of progressive decline in short-term memory. Physical exam findings of gross hyporeflexia, otherwise neurologically intact.  Her imaging shows a 3 mm subdural hemorrhage along the left parieto-occipital convexity, no midline shift or mass effect.    Recommendations  Subdural hematoma  In a large database study from [place] (2015) the cumulative risk of recurrent subdural hematoma was 9% at 4 weeks after the primary bleeding, increasing to and stabilizing at 14% after one year. Predictors associated with recurrence were: Male sex (RR 1.60, 95% CI:1.43-1.80), older age (>70 years compared to 20-49 years; RR 1.41, 95% CI: 1.21-1.65), alcohol addiction (RR 1.20, 95% CI:1.04-1.37), surgical treatment (RR 1.76, 95% CI:1.58-1.96), trauma diagnoses (RR 1.14, 95%  CI:1.03-1.27), and diabetes mellitus (RR 1.40, 95% CI:1.11-1.74). Out of a selected combination of risk factors, the highest cumulative 1-year recurrence risks for subdural hematoma of 25% (compared to 14% for all patients) was found in surgically treated males with diabetes mellitus.  Oddly adjusting for anticoagulation use did not significantly alter the risk of recurrence.      For further evaluation we will send Ms. Cleveland for a noncontrasted CT of the head.  I would like her to return for reevaluation after all studies been completed.  We additionally discussed signs and symptoms to be observant for to include, but not limited to persistent left-sided headache with contralateral weakness, a decline in receptive and/or expressive speech, and/or a decline in her gait and balance.  Should she develop any of the symptoms she should contact the neurosurgical immediately and/or seek urgent care at her local ED.    Progressive decline in short-term memory  Ms. Cleveland, predominantly family, have noticed a progressive decline over the past year in her short-term memory, requiring recurrent reminders of conversations as well as her inability to pay bills in a timely manner.  Her MMSE score today 25/30 suggesting no cognitive impairment, however there were deficits on the mental status exam, predominantly with short-term memory.      We discussed her history of presenting illness, physical exam, and image findings.  I do not believe her memory deficit is as a direct result of her subdural hemorrhage, more likely associated with age-related dementia.  Additionally, she has no evidence of ventriculomegaly or any additional cardinal signs for NPH.  For continued evaluation I recommended a referral to neurology.  Both patient and family agree with this plan of care.    Overweight (BMI 25.0-29.9)  Body mass index is 29.37 kg/m²..  Information on the DASH diet provided in the AVS.  We will continue to provided diet and exercise  information with the goal of weight loss at each scheduled appointment.     Fall risk  [facility] Fall Risk Assessment was completed, and patient is at LOW risk for falls.Assessment completed on:10/10/2024  Fall risk information provided in AVS.    ADVANCED CARE PLANNING  Information on advance directives provided in AVS.    Diagnoses and all orders for this visit:    1. Subdural hemorrhage (Primary)  -     CT head wo contrast; Future    2. Gradual-onset memory impairment  -     Ambulatory Referral to Neurology    3. Overweight with body mass index (BMI) of 29 to 29.9 in adult      Return in about 1 month (around 11/10/2024) for Follow-up with Ryne Balderas on Dr. Brizuela day.    Thank you for this Consultation and the opportunity to participate in Mary's care.    Sincerely,  Ryne Balderas, NEHA

## 2024-10-24 ENCOUNTER — HOSPITAL ENCOUNTER (OUTPATIENT)
Dept: CT IMAGING | Facility: HOSPITAL | Age: 84
Discharge: HOME | End: 2024-10-24
Admitting: NURSE PRACTITIONER
Payer: MEDICARE

## 2024-10-24 DIAGNOSIS — I62.00 SUBDURAL HEMORRHAGE: ICD-10-CM

## 2024-10-24 PROCEDURE — 70450 CT HEAD/BRAIN W/O DYE: CPT

## 2024-11-11 ENCOUNTER — OFFICE VISIT (OUTPATIENT)
Dept: NEUROSURGERY | Facility: CLINIC | Age: 84
End: 2024-11-11
Payer: MEDICARE

## 2024-11-11 VITALS — HEIGHT: 64 IN | WEIGHT: 171 LBS | BODY MASS INDEX: 29.19 KG/M2

## 2024-11-11 DIAGNOSIS — E66.3 OVERWEIGHT WITH BODY MASS INDEX (BMI) OF 29 TO 29.9 IN ADULT: ICD-10-CM

## 2024-11-11 DIAGNOSIS — Z91.81 AT RISK FOR FALLS: ICD-10-CM

## 2024-11-11 DIAGNOSIS — I62.00 SUBDURAL HEMORRHAGE: Primary | ICD-10-CM

## 2024-11-11 PROCEDURE — 1159F MED LIST DOCD IN RCRD: CPT | Performed by: NURSE PRACTITIONER

## 2024-11-11 PROCEDURE — 1160F RVW MEDS BY RX/DR IN RCRD: CPT | Performed by: NURSE PRACTITIONER

## 2024-11-11 PROCEDURE — 99214 OFFICE O/P EST MOD 30 MIN: CPT | Performed by: NURSE PRACTITIONER

## 2024-11-11 NOTE — PROGRESS NOTES
Chief complaint:   Chief Complaint   Patient presents with    subdural hemorrhage     PT is here for followup.     Subjective     HPI:   Mary Cleveland is a 84 y.o.  female who presents today with her  for continued evaluation of a 3 mm subdural hemorrhage along the left parieto-occipital convexity in which she most likely sustained as a result of a fall from standing height.    Ms. Cleveland has done well since we last saw her.  She continues to complain of occasional brief episodes of dizziness with standing, as well as intermittent confusion and need for redirection.  Otherwise, she is asymptomatic, denying visual disturbances, headaches, facial asymmetry or dysesthesias, unilateral weakness, difficulty with gait or balance, recent falls, nausea, and vomiting, or bowel bladder incontinence.  She remains on Aricept per her PCP and is scheduled for an evaluation with neurologist, Dr. Tillman on 12/19/2024.   She currently rates the severity of her symptoms 0/10.    ROS  Review of Systems   Constitutional: Negative.    HENT: Negative.     Eyes: Negative.    Respiratory: Negative.     Cardiovascular: Negative.    Gastrointestinal: Negative.  Negative for nausea and vomiting.   Endocrine: Negative.    Genitourinary: Negative.    Skin: Negative.    Allergic/Immunologic: Negative.    Neurological:  Positive for dizziness. Negative for tremors, seizures, syncope, facial asymmetry, speech difficulty, weakness, light-headedness, numbness and headaches.   Hematological: Negative.    Psychiatric/Behavioral:  Positive for confusion.    All other systems reviewed and are negative.    PFSH:  Past Medical History:   Diagnosis Date    Basal cell carcinoma of right cheek 2/8/2017    Brow ptosis     Dermatochalasia     Hypertension     Melanoma in situ of cheek     Right Cheek    Pseudoptosis     Ptosis of eyelid     Solar lentigo     Right Cheek    Visual field defect      Past Surgical History:   Procedure Laterality  "Date    BLEPHAROPLASTY Bilateral 11/01/2016    Procedure: Bilateral upper blepharoplasty with excision of excessive tissue weighing eyelids down AND excisional biopsy of right cheek lesion with permanent section analysis and linear closure.;  Surgeon: Andriy Sorenson MD;  Location: [facility] OR;  Service:     BLEPHAROPLASTY      BREAST BIOPSY      CHOLECYSTECTOMY      COLON SURGERY      RESECTION    COLONOSCOPY      HYSTERECTOMY      TOTAL    OOPHORECTOMY      SKIN LESION EXCISION       Objective      Current Outpatient Medications   Medication Sig Dispense Refill    amoxicillin-clavulanate (Augmentin) 875-125 MG per tablet Take 1 tablet by mouth Every 12 (Twelve) Hours. 20 tablet 0    azithromycin (ZITHROMAX) 250 MG tablet Take 2 tablets by mouth the first day, then 1 tablet daily for more 4 days. 6 tablet 0    donepezil (ARICEPT) 10 MG tablet TAKE 1 TABLET BY MOUTH IN THE MORNING FOR MEMORY FOR 90 DAYS      hydrochlorothiazide (MICROZIDE) 12.5 MG capsule Take 1 capsule by mouth Every Morning.      meloxicam (MOBIC) 7.5 MG tablet       metoprolol tartrate (LOPRESSOR) 50 MG tablet       omeprazole (priLOSEC) 20 MG capsule        No current facility-administered medications for this visit.     Vital Signs  Ht 162.6 cm (64\")   Wt 77.6 kg (171 lb)   BMI 29.35 kg/m²   Physical Exam  Vitals and nursing note reviewed.   Constitutional:       General: She is not in acute distress.     Appearance: Normal appearance. She is well-developed, well-groomed and overweight. She is not ill-appearing, toxic-appearing or diaphoretic.      Comments: BMI 29.35   HENT:      Head: Normocephalic and atraumatic.      Right Ear: Hearing normal.      Left Ear: Hearing normal.   Eyes:      General: Lids are normal.      Extraocular Movements: Extraocular movements intact.      Conjunctiva/sclera: Conjunctivae normal.      Pupils: Pupils are equal, round, and reactive to light.   Neck:      Trachea: Trachea normal.   Cardiovascular:      Rate " and Rhythm: Normal rate and regular rhythm.   Pulmonary:      Effort: Pulmonary effort is normal. No tachypnea, bradypnea, accessory muscle usage or respiratory distress.   Abdominal:      Palpations: Abdomen is soft.   Musculoskeletal:      Cervical back: Full passive range of motion without pain and neck supple.   Skin:     General: Skin is warm and dry.   Neurological:      GCS: GCS eye subscore is 4. GCS verbal subscore is 5. GCS motor subscore is 6.      Coordination: Coordination is intact.      Deep Tendon Reflexes:      Reflex Scores:       Tricep reflexes are 1+ on the right side and 1+ on the left side.       Bicep reflexes are 1+ on the right side and 1+ on the left side.       Brachioradialis reflexes are 1+ on the right side and 1+ on the left side.       Patellar reflexes are 1+ on the right side and 1+ on the left side.       Achilles reflexes are 1+ on the right side and 1+ on the left side.  Psychiatric:         Speech: Speech normal.         Behavior: Behavior normal. Behavior is cooperative.     Neurological Exam  Mental Status  Awake, alert and oriented to person, place and time. Speech is normal. Able to name objects. Attention and concentration are normal.  See MMSE.    Cranial Nerves  CN I: Sense of smell is normal.  CN II: Visual acuity is normal.  CN III, IV, VI: Extraocular movements intact bilaterally. Normal lids and orbits bilaterally. Pupils equal round and reactive to light bilaterally.  CN V: Facial sensation is normal.  CN VII: Full and symmetric facial movement.  CN IX, X: Palate elevates symmetrically  CN XI: Shoulder shrug strength is normal.  CN XII: Tongue midline without atrophy or fasciculations.    Motor  Normal muscle bulk throughout. Normal muscle tone. No pronator drift.    Sensory  Sensation is intact to light touch, pinprick, vibration and proprioception in all four extremities.    Reflexes                                            Right                       Left  Brachioradialis                    1+                         1+  Biceps                                 1+                         1+  Triceps                                1+                         1+  Patellar                                1+                         1+  Achilles                                1+                         1+  Right Plantar: downgoing  Left Plantar: downgoing    Right pathological reflexes: Rj's absent. Ankle clonus absent.  Left pathological reflexes: Rj's absent. Ankle clonus absent.    Coordination    Finger-to-nose, rapid alternating movements and heel-to-shin normal bilaterally without dysmetria.    Gait  Casual gait is normal including stance, stride, and arm swing.  Normal gait and arm sway.  No evidence of en bloc turning..    (12 bullet pts)    Results Review:   9/18/2024.  3 mm subdural hemorrhage along the left parieto-occipital convexity, no midline shift or mass effect      10/24/2024      CT Head Without Contrast    Result Date: 10/24/2024  1. Chronic changes and no acute intracranial findings. 2. Mucosal thickening at the right anterior ethmoid air cells and right inferior mastoid air cells. There also may be bilateral nasal polyps. 3. Trace left parietal convexity subdural hematoma has resolved.  This report was signed and finalized on 10/24/2024 3:55 PM by Pedrito Mace.         Assessment/Plan:   Mary Cleveland is a 84 y.o. female with significant medical comorbidities to include skin cancer, hypertension, and she is overweight.  She presents with a known problem of 1 year onset of progressive decline in short-term memory. Physical exam findings of gross hyporeflexia, otherwise neurologically intact.  Her initial imaging from 9/18/2024 shown a 3 mm subdural hemorrhage along the left parieto-occipital convexity, no midline shift or mass effect.  Repeat imaging from 10/24/2024 shows complete resolve of subdural hemorrhage.      Recommendations  Subdural hematoma  In a large database study from [place] (2015) the cumulative risk of recurrent subdural hematoma was 9% at 4 weeks after the primary bleeding, increasing to and stabilizing at 14% after one year. Predictors associated with recurrence were: Male sex (RR 1.60, 95% CI:1.43-1.80), older age (>70 years compared to 20-49 years; RR 1.41, 95% CI: 1.21-1.65), alcohol addiction (RR 1.20, 95% CI:1.04-1.37), surgical treatment (RR 1.76, 95% CI:1.58-1.96), trauma diagnoses (RR 1.14, 95% CI:1.03-1.27), and diabetes mellitus (RR 1.40, 95% CI:1.11-1.74). Out of a selected combination of risk factors, the highest cumulative 1-year recurrence risks for subdural hematoma of 25% (compared to 14% for all patients) was found in surgically treated males with diabetes mellitus.  Oddly adjusting for anticoagulation use did not significantly alter the risk of recurrence.     Ms. Cleveland presents today for continued evaluation of a 3 mm subdural hemorrhage along the left parieto-occipital convexity in which she most likely sustained as a result of a fall from standing height.  Other than occasional brief episodes with dizziness with standing she is asymptomatic.  Her neurologic exam is unchanged and her imaging shows complete resolve of previously identified subdural hemorrhage.  Based on these findings no additional imaging or follow-up warranted at this time.  I did recommend she keep her previously scheduled appointment with neurologist, Dr. Tillman for evaluation of a progressive decline in memory.  Both patient and family know they may contact the neurosurgical clinic to return sooner for any new or additional concerns and agrees with this plan of care.     Overweight (BMI 25.0-29.9)  Body mass index is 29.35 kg/m²..  Information on the DASH diet provided in the AVS.  We will continue to provided diet and exercise information with the goal of weight loss at each scheduled appointment.      Fall  risk  STEADI Fall Risk Assessment was completed, and patient is at LOW risk for falls.Assessment completed on:10/10/2024  Fall risk information provided in AVS.    Diagnoses and all orders for this visit:    1. Subdural hemorrhage (Primary)    2. Overweight with body mass index (BMI) of 29 to 29.9 in adult    3. At risk for falls      Return if symptoms worsen or fail to improve.    Thank you, for allowing me to continue to participate in the care of this patient.    Sincerely,  Ryne Balderas, NEHA

## 2024-11-11 NOTE — PATIENT INSTRUCTIONS

## 2024-12-19 ENCOUNTER — OFFICE VISIT (OUTPATIENT)
Dept: NEUROLOGY | Age: 84
End: 2024-12-19
Payer: MEDICARE

## 2024-12-19 VITALS
HEIGHT: 64 IN | RESPIRATION RATE: 16 BRPM | SYSTOLIC BLOOD PRESSURE: 126 MMHG | DIASTOLIC BLOOD PRESSURE: 70 MMHG | WEIGHT: 174 LBS | OXYGEN SATURATION: 98 % | HEART RATE: 65 BPM | BODY MASS INDEX: 29.71 KG/M2

## 2024-12-19 DIAGNOSIS — Z86.79 HISTORY OF HYPERTENSION: ICD-10-CM

## 2024-12-19 DIAGNOSIS — R41.3 MEMORY LOSS: Primary | ICD-10-CM

## 2024-12-19 DIAGNOSIS — R93.0 ABNORMAL MRI OF HEAD: ICD-10-CM

## 2024-12-19 DIAGNOSIS — R41.3 MEMORY LOSS: ICD-10-CM

## 2024-12-19 LAB — VIT B12 SERPL-MCNC: 281 PG/ML (ref 232–1245)

## 2024-12-19 PROCEDURE — 99204 OFFICE O/P NEW MOD 45 MIN: CPT | Performed by: PSYCHIATRY & NEUROLOGY

## 2024-12-19 PROCEDURE — 1123F ACP DISCUSS/DSCN MKR DOCD: CPT | Performed by: PSYCHIATRY & NEUROLOGY

## 2024-12-19 RX ORDER — DONEPEZIL HYDROCHLORIDE 10 MG/1
TABLET, FILM COATED ORAL
COMMUNITY

## 2024-12-19 RX ORDER — CITALOPRAM HYDROBROMIDE 10 MG/1
10 TABLET ORAL DAILY
COMMUNITY
Start: 2024-11-20

## 2024-12-19 NOTE — PROGRESS NOTES
Chief Complaint   Patient presents with    New Patient     Patient has a rapid decline in memory,confused       Savana Mas is a 84 y.o. year old female who is seen for evaluation of her progressive difficulties with short-term memory.  Symptoms estimated to be there for the past 9 to 12 months.  Her sister had poor memory.  The patient does have anxiety.  She recently scored a 25/30 on her MMSE which was within normal limits.  She saw neurosurgery who did not feel she had NPH.  Recently found to have a small subdural along the left parietal occipital convexity..  This was found on an MRI from .  That also revealed mild atrophy in the mesial temporal lobes.  She is on Aricept 10 mg a day currently.  May be helping some.    Active Ambulatory Problems     Diagnosis Date Noted    No Active Ambulatory Problems     Resolved Ambulatory Problems     Diagnosis Date Noted    No Resolved Ambulatory Problems     Past Medical History:   Diagnosis Date    Brow ptosis     Colon polyps     Dermatochalasia     Hyperlipidemia     Hypertension        Past Surgical History:   Procedure Laterality Date    CHOLECYSTECTOMY      COLECTOMY      HYSTERECTOMY, TOTAL ABDOMINAL (CERVIX REMOVED)         Family History   Problem Relation Age of Onset    Cancer Other     Heart Disease Other     Hypertension Other     Hypertension Mother     High Cholesterol Father        Allergies   Allergen Reactions    Sulfa Antibiotics        Social History     Socioeconomic History    Marital status:      Spouse name: Not on file    Number of children: Not on file    Years of education: Not on file    Highest education level: Not on file   Occupational History    Not on file   Tobacco Use    Smoking status: Former     Current packs/day: 0.00     Average packs/day: 0.3 packs/day for 30.0 years (7.5 ttl pk-yrs)     Types: Cigarettes     Start date: 1974     Quit date: 2004     Years since quittin.9    Smokeless tobacco: Never   Vaping

## 2024-12-19 NOTE — PROGRESS NOTES
REVIEW OF SYSTEMS    Constitutional: []Fever []Sweat []Chills [] Recent Injury [x] Denies all unless marked  HEENT:[]Headache  [] Head Injury/Hearing Loss  [] Sore Throat  [] Ear Ache/Dizziness  [x] Denies all unless marked  Spine:  [x] Neck pain  [] Back pain  [] Sciaticia  [x] Denies all unless marked  Cardiovascular:[]Heart Disease []Chest Pain [] Palpitations  [x] Denies all unless marked  Pulmonary: []Shortness of Breath []Cough   [x] Denies all unless marke  Gastrointestinal: []Nausea  []Vomiting  []Abdominal Pain  []Constipation  []Diarrhea  []Dark Bloody Stools  [x] Denies all unless marked  Psychiatric/Behavioral:[] Depression [] Anxiety [x] Denies all unless marked  Genitourinary:   [] Frequency  [] Urgency  [] Incontinence [] Pain with Urination  [x] Denies all unless marked  Extremities: []Pain  []Swelling  [x] Denies all unless marked  Musculoskeletal: [] Muscle Pain  [] Joint Pain  [] Arthritis [] Muscle Cramps [] Muscle Twitches  [x] Denies all unless marked  Sleep: [] Insomnia [] Snoring [] Restless Legs [] Sleep Apnea  [] Daytime Sleepiness  [x] Denies all unless marked  Skin:[] Rash [] Skin Discoloration [x] Denies all unless marked   Neurological: []Visual Disturbance/Memory Loss [] Loss of Balance [] Slurred Speech/Weakness [] Seizures  [] Vertigo/Dizziness [x] Denies all unless marked

## 2025-01-27 ENCOUNTER — TELEPHONE (OUTPATIENT)
Dept: NEUROLOGY | Age: 85
End: 2025-01-27

## 2025-01-28 DIAGNOSIS — R41.3 MEMORY LOSS: Primary | ICD-10-CM

## 2025-01-28 RX ORDER — MEMANTINE HYDROCHLORIDE 5 MG/1
TABLET ORAL
Qty: 120 TABLET | Refills: 0 | Status: SHIPPED | OUTPATIENT
Start: 2025-01-28

## 2025-03-12 ENCOUNTER — OFFICE VISIT (OUTPATIENT)
Dept: ENT CLINIC | Age: 85
End: 2025-03-12
Payer: MEDICARE

## 2025-03-12 VITALS
HEIGHT: 64 IN | BODY MASS INDEX: 29.37 KG/M2 | SYSTOLIC BLOOD PRESSURE: 122 MMHG | WEIGHT: 172 LBS | DIASTOLIC BLOOD PRESSURE: 80 MMHG

## 2025-03-12 DIAGNOSIS — K13.70 ORAL LESION: Primary | ICD-10-CM

## 2025-03-12 PROCEDURE — 1159F MED LIST DOCD IN RCRD: CPT | Performed by: NURSE PRACTITIONER

## 2025-03-12 PROCEDURE — 1123F ACP DISCUSS/DSCN MKR DOCD: CPT | Performed by: NURSE PRACTITIONER

## 2025-03-12 PROCEDURE — 99203 OFFICE O/P NEW LOW 30 MIN: CPT | Performed by: NURSE PRACTITIONER

## 2025-03-12 PROCEDURE — 1160F RVW MEDS BY RX/DR IN RCRD: CPT | Performed by: NURSE PRACTITIONER

## 2025-03-12 RX ORDER — VALACYCLOVIR HYDROCHLORIDE 500 MG/1
500 TABLET, FILM COATED ORAL 2 TIMES DAILY
Qty: 28 TABLET | Refills: 0 | Status: SHIPPED | OUTPATIENT
Start: 2025-03-12 | End: 2025-03-26

## 2025-03-12 ASSESSMENT — ENCOUNTER SYMPTOMS
ALLERGIC/IMMUNOLOGIC NEGATIVE: 1
GASTROINTESTINAL NEGATIVE: 1
RESPIRATORY NEGATIVE: 1
EYES NEGATIVE: 1

## 2025-03-12 NOTE — PROGRESS NOTES
3/12/2025    Savana Mas (:  1940) is a 84 y.o. female, Established patient, here for evaluation of the following chief complaint(s):  New Patient (Oral lesion )      Vitals:    25 1400   BP: 122/80   Weight: 78 kg (172 lb)   Height: 1.626 m (5' 4\")       Wt Readings from Last 3 Encounters:   25 78 kg (172 lb)   24 78.9 kg (174 lb)   10/20/16 78.9 kg (174 lb)       BP Readings from Last 3 Encounters:   25 122/80   24 126/70   10/20/16 118/72         SUBJECTIVE/OBJECTIVE:    Patient seen today for her mouth. Her family is with her and helps provide history due to patient having Alzheimer's. Patient's family reports they went to her PCP and when he looked in her mouth, he noticed spot on the inside of her left cheek. She denies this being tender. She also complains of a tender spot on her bottom gum line. Her family reports she has had dentures but they have never fit right. She denies fevers, chills, or unexpected weight loss. Her family reports that she is a former smoker.        Review of Systems   Constitutional: Negative.    HENT:  Positive for mouth sores.    Eyes: Negative.    Respiratory: Negative.     Cardiovascular: Negative.    Gastrointestinal: Negative.    Endocrine: Negative.    Musculoskeletal: Negative.    Skin: Negative.    Allergic/Immunologic: Negative.    Neurological: Negative.    Hematological: Negative.    Psychiatric/Behavioral: Negative.          Physical Exam  Vitals reviewed.   Constitutional:       Appearance: Normal appearance. She is normal weight.   HENT:      Head: Normocephalic and atraumatic.      Right Ear: External ear normal.      Left Ear: External ear normal.      Nose: Nose normal.      Mouth/Throat:      Mouth: Mucous membranes are moist. Oral lesions (nodule on left buccal mucosa) present.      Dentition: Has dentures (top). Gum lesions (bottom gum) present.      Pharynx: Oropharynx is clear.   Eyes:      Extraocular Movements:

## 2025-03-14 RX ORDER — MEMANTINE HYDROCHLORIDE 10 MG/1
10 TABLET ORAL 2 TIMES DAILY
Qty: 180 TABLET | Refills: 3 | Status: SHIPPED | OUTPATIENT
Start: 2025-03-14

## 2025-03-14 NOTE — TELEPHONE ENCOUNTER
Requested Prescriptions     Pending Prescriptions Disp Refills    memantine (NAMENDA) 10 MG tablet 180 tablet 3     Sig: Take 1 tablet by mouth 2 times daily       Last Office Visit: 12/19/2024  Next Office Visit: 3/24/2025  Last Medication Refill:patient has just completed the titration

## 2025-03-24 ENCOUNTER — OFFICE VISIT (OUTPATIENT)
Dept: NEUROLOGY | Age: 85
End: 2025-03-24
Payer: MEDICARE

## 2025-03-24 VITALS
SYSTOLIC BLOOD PRESSURE: 124 MMHG | HEART RATE: 68 BPM | BODY MASS INDEX: 29.37 KG/M2 | RESPIRATION RATE: 16 BRPM | OXYGEN SATURATION: 98 % | DIASTOLIC BLOOD PRESSURE: 86 MMHG | HEIGHT: 64 IN | WEIGHT: 172 LBS

## 2025-03-24 DIAGNOSIS — R93.0 ABNORMAL MRI OF HEAD: ICD-10-CM

## 2025-03-24 DIAGNOSIS — R41.3 MEMORY LOSS: Primary | ICD-10-CM

## 2025-03-24 DIAGNOSIS — Z86.79 HISTORY OF HYPERTENSION: ICD-10-CM

## 2025-03-24 PROCEDURE — 99213 OFFICE O/P EST LOW 20 MIN: CPT | Performed by: PSYCHIATRY & NEUROLOGY

## 2025-03-24 PROCEDURE — 1123F ACP DISCUSS/DSCN MKR DOCD: CPT | Performed by: PSYCHIATRY & NEUROLOGY

## 2025-03-24 PROCEDURE — 1159F MED LIST DOCD IN RCRD: CPT | Performed by: PSYCHIATRY & NEUROLOGY

## 2025-03-24 NOTE — PROGRESS NOTES
Chief Complaint   Patient presents with    Memory Loss     Patient states she is doing well,family has noticed a decline in memory, patient is sitting in chair not wanting to get up       Savana Mas is a 84 y.o. year old female who is seen for evaluation of her progressive difficulties with short-term memory.  Symptoms estimated to be there for the past  12 months.  Her sister had poor memory.  The patient does have anxiety.  She recently scored a 25/30 on her MMSE which was within normal limits.  She saw neurosurgery who did not feel she had NPH.  Recently found to have a small subdural along the left parietal occipital convexity..  This was found on an MRI from 9/24.  That also revealed mild atrophy in the mesial temporal lobes.  She is on Aricept 10 mg a day currently.  May be helping some.  Namenda has been added since her last visit.  Recent B12 level was borderline low at 281.  Celexa was increased about 2 to 3 weeks ago.  She sits in her chair a lot according to her family.  Active Ambulatory Problems     Diagnosis Date Noted    No Active Ambulatory Problems     Resolved Ambulatory Problems     Diagnosis Date Noted    No Resolved Ambulatory Problems     Past Medical History:   Diagnosis Date    Brow ptosis     Colon polyps     Dermatochalasia     Hyperlipidemia     Hypertension        Past Surgical History:   Procedure Laterality Date    CHOLECYSTECTOMY      COLECTOMY      HYSTERECTOMY, TOTAL ABDOMINAL (CERVIX REMOVED)         Family History   Problem Relation Age of Onset    Cancer Other     Heart Disease Other     Hypertension Other     Hypertension Mother     High Cholesterol Father        Allergies   Allergen Reactions    Sulfa Antibiotics        Social History     Socioeconomic History    Marital status:      Spouse name: Not on file    Number of children: Not on file    Years of education: Not on file    Highest education level: Not on file   Occupational History    Not on file   Tobacco Use

## 2025-04-08 ENCOUNTER — TELEPHONE (OUTPATIENT)
Dept: ENT CLINIC | Age: 85
End: 2025-04-08

## 2025-04-08 NOTE — TELEPHONE ENCOUNTER
called to reschedule appt with Dr Reese. 04/10/2025- Dr Reese will not be in office that afternoon. no answer. could not leave vmail.

## 2025-04-08 NOTE — TELEPHONE ENCOUNTER
called to reschedule appt 04/10/2025 being that Dr Reese will not be in office that afternoon. no answer. left vmail

## 2025-04-17 ENCOUNTER — OFFICE VISIT (OUTPATIENT)
Dept: ENT CLINIC | Age: 85
End: 2025-04-17
Payer: MEDICARE

## 2025-04-17 VITALS
DIASTOLIC BLOOD PRESSURE: 74 MMHG | WEIGHT: 174 LBS | HEIGHT: 64 IN | SYSTOLIC BLOOD PRESSURE: 132 MMHG | BODY MASS INDEX: 29.71 KG/M2

## 2025-04-17 DIAGNOSIS — K13.70 ORAL LESION: Primary | ICD-10-CM

## 2025-04-17 PROCEDURE — 1159F MED LIST DOCD IN RCRD: CPT | Performed by: OTOLARYNGOLOGY

## 2025-04-17 PROCEDURE — 1160F RVW MEDS BY RX/DR IN RCRD: CPT | Performed by: OTOLARYNGOLOGY

## 2025-04-17 PROCEDURE — 1123F ACP DISCUSS/DSCN MKR DOCD: CPT | Performed by: OTOLARYNGOLOGY

## 2025-04-17 PROCEDURE — 99213 OFFICE O/P EST LOW 20 MIN: CPT | Performed by: OTOLARYNGOLOGY

## 2025-04-17 RX ORDER — XYLITOL/YERBA SANTA
AEROSOL, SPRAY WITH PUMP (ML) MUCOUS MEMBRANE
Qty: 236 ML | Refills: 1 | Status: SHIPPED | OUTPATIENT
Start: 2025-04-17

## 2025-04-17 ASSESSMENT — ENCOUNTER SYMPTOMS
RESPIRATORY NEGATIVE: 1
GASTROINTESTINAL NEGATIVE: 1
EYES NEGATIVE: 1
ALLERGIC/IMMUNOLOGIC NEGATIVE: 1

## 2025-04-17 NOTE — PROGRESS NOTES
2025    Savana Mas (:  1940) is a 84 y.o. female, Established patient, here for evaluation of the following chief complaint(s):  New Patient (Oral lesion)      Vitals:    25 1304   BP: 132/74   Weight: 78.9 kg (174 lb)   Height: 1.626 m (5' 4\")       Wt Readings from Last 3 Encounters:   25 78.9 kg (174 lb)   25 78 kg (172 lb)   25 78 kg (172 lb)       BP Readings from Last 3 Encounters:   25 132/74   25 124/86   25 122/80         SUBJECTIVE/OBJECTIVE:    Patient seen today for lesion on her left buccal mucosa.  They are not sure how long it has been there.  The primary care sought and referred to us in [place] saw her and placed on antivirals.  They did not help.  Lesion is still there.  It is not causing any pain.  She does report some moderate pain in her lower gumline.  She does wear dentures        Review of Systems   Constitutional: Negative.    HENT:  Positive for dental problem.    Eyes: Negative.    Respiratory: Negative.     Cardiovascular: Negative.    Gastrointestinal: Negative.    Endocrine: Negative.    Musculoskeletal: Negative.    Skin: Negative.    Allergic/Immunologic: Negative.    Neurological: Negative.    Hematological: Negative.    Psychiatric/Behavioral: Negative.          Physical Exam  Vitals reviewed.   Constitutional:       Appearance: Normal appearance. She is normal weight.   HENT:      Head: Normocephalic and atraumatic.      Right Ear: Tympanic membrane, ear canal and external ear normal.      Left Ear: Tympanic membrane, ear canal and external ear normal.      Nose: Nose normal.      Mouth/Throat:      Mouth: Mucous membranes are moist.      Pharynx: Oropharynx is clear.      Comments: Benign-appearing lesion left buccal mucosa  Eyes:      Extraocular Movements: Extraocular movements intact.      Pupils: Pupils are equal, round, and reactive to light.   Cardiovascular:      Rate and Rhythm: Normal rate and regular rhythm.

## 2025-06-26 ENCOUNTER — OFFICE VISIT (OUTPATIENT)
Dept: NEUROLOGY | Age: 85
End: 2025-06-26
Payer: MEDICARE

## 2025-06-26 VITALS
HEART RATE: 69 BPM | HEIGHT: 64 IN | WEIGHT: 174 LBS | DIASTOLIC BLOOD PRESSURE: 74 MMHG | SYSTOLIC BLOOD PRESSURE: 128 MMHG | RESPIRATION RATE: 16 BRPM | BODY MASS INDEX: 29.71 KG/M2 | OXYGEN SATURATION: 93 %

## 2025-06-26 DIAGNOSIS — R41.3 MEMORY LOSS: Primary | ICD-10-CM

## 2025-06-26 DIAGNOSIS — Z86.79 HISTORY OF HYPERTENSION: ICD-10-CM

## 2025-06-26 DIAGNOSIS — Z86.59 HISTORY OF ANXIETY: ICD-10-CM

## 2025-06-26 PROCEDURE — 99213 OFFICE O/P EST LOW 20 MIN: CPT | Performed by: PSYCHIATRY & NEUROLOGY

## 2025-06-26 PROCEDURE — 1123F ACP DISCUSS/DSCN MKR DOCD: CPT | Performed by: PSYCHIATRY & NEUROLOGY

## 2025-06-26 PROCEDURE — 1159F MED LIST DOCD IN RCRD: CPT | Performed by: PSYCHIATRY & NEUROLOGY

## 2025-06-26 RX ORDER — BUSPIRONE HYDROCHLORIDE 10 MG/1
10 TABLET ORAL 2 TIMES DAILY
Qty: 60 TABLET | Refills: 0 | Status: SHIPPED | OUTPATIENT
Start: 2025-06-26 | End: 2025-07-26

## 2025-06-26 NOTE — PROGRESS NOTES
Chief Complaint   Patient presents with    Memory Loss     Family states short term memory is worse       Savana Mas is a 84 y.o. year old female who is seen for evaluation of her progressive difficulties with short-term memory.  Symptoms estimated to be there for the past  12 months.  Her sister had poor memory.  The patient does have anxiety.  She scored a 25/30 on her MMSE which was within normal limits.  She saw neurosurgery who did not feel she had NPH.  Previously found to have a small subdural along the left parietal occipital convexity..  This was found on an MRI from 9/24.  That also revealed mild atrophy in the mesial temporal lobes.  She is on Aricept 10 mg a day currently.  May be helping some.  Namenda has been added since her last visit.  Recent B12 level was borderline low at 281.  She was last seen here 3/25 and no changes were made..  Has been having some OCD type behavior constantly picking her fingers since she was here last.  Celexa was reportedly increased but has not helped.  She is here today with her family.  Memory has slowly worsened.  Active Ambulatory Problems     Diagnosis Date Noted    No Active Ambulatory Problems     Resolved Ambulatory Problems     Diagnosis Date Noted    No Resolved Ambulatory Problems     Past Medical History:   Diagnosis Date    Brow ptosis     Colon polyps     Dermatochalasia     Hyperlipidemia     Hypertension        Past Surgical History:   Procedure Laterality Date    CHOLECYSTECTOMY      COLECTOMY      HYSTERECTOMY, TOTAL ABDOMINAL (CERVIX REMOVED)         Family History   Problem Relation Age of Onset    Cancer Other     Heart Disease Other     Hypertension Other     Hypertension Mother     High Cholesterol Father        Allergies   Allergen Reactions    Sulfa Antibiotics        Social History     Socioeconomic History    Marital status:      Spouse name: Not on file    Number of children: Not on file    Years of education: Not on file    Highest

## 2025-07-17 ENCOUNTER — OFFICE VISIT (OUTPATIENT)
Dept: ENT CLINIC | Age: 85
End: 2025-07-17
Payer: MEDICARE

## 2025-07-17 VITALS
HEIGHT: 64 IN | DIASTOLIC BLOOD PRESSURE: 72 MMHG | BODY MASS INDEX: 30.05 KG/M2 | SYSTOLIC BLOOD PRESSURE: 126 MMHG | WEIGHT: 176 LBS

## 2025-07-17 DIAGNOSIS — K13.70 ORAL LESION: Primary | ICD-10-CM

## 2025-07-17 PROCEDURE — 1160F RVW MEDS BY RX/DR IN RCRD: CPT | Performed by: OTOLARYNGOLOGY

## 2025-07-17 PROCEDURE — 1123F ACP DISCUSS/DSCN MKR DOCD: CPT | Performed by: OTOLARYNGOLOGY

## 2025-07-17 PROCEDURE — 1159F MED LIST DOCD IN RCRD: CPT | Performed by: OTOLARYNGOLOGY

## 2025-07-17 PROCEDURE — 99213 OFFICE O/P EST LOW 20 MIN: CPT | Performed by: OTOLARYNGOLOGY

## 2025-07-17 ASSESSMENT — ENCOUNTER SYMPTOMS
ALLERGIC/IMMUNOLOGIC NEGATIVE: 1
EYES NEGATIVE: 1
RESPIRATORY NEGATIVE: 1
GASTROINTESTINAL NEGATIVE: 1

## 2025-07-17 NOTE — PROGRESS NOTES
2025    Savana Mas (:  1940) is a 84 y.o. female, Established patient, here for evaluation of the following chief complaint(s):  Follow-up (Oral lesion)      Vitals:    25 1329   BP: 126/72   Weight: 79.8 kg (176 lb)   Height: 1.626 m (5' 4\")       Wt Readings from Last 3 Encounters:   25 79.8 kg (176 lb)   25 78.9 kg (174 lb)   25 78.9 kg (174 lb)       BP Readings from Last 3 Encounters:   25 126/72   25 128/74   25 132/74         SUBJECTIVE/OBJECTIVE:    Patient seen today for oral cavity.  They have been using the Biotene on most days and the lesion on her bucca mucosa has not changed.  It is not painful and has not creased in size.  She does have some pain in her mandibular gumline where she does not wear dentures.  She wears upper dentures.        Review of Systems   Constitutional: Negative.    HENT:  Positive for hearing loss.    Eyes: Negative.    Respiratory: Negative.     Cardiovascular: Negative.    Gastrointestinal: Negative.    Endocrine: Negative.    Musculoskeletal: Negative.    Skin: Negative.    Allergic/Immunologic: Negative.    Neurological: Negative.    Hematological: Negative.    Psychiatric/Behavioral: Negative.          Physical Exam  Vitals reviewed.   Constitutional:       Appearance: Normal appearance. She is normal weight.   HENT:      Head: Normocephalic and atraumatic.      Right Ear: Tympanic membrane, ear canal and external ear normal.      Left Ear: Tympanic membrane, ear canal and external ear normal.      Nose: Nose normal.      Mouth/Throat:      Mouth: Mucous membranes are moist.      Pharynx: Oropharynx is clear.      Comments: Lesion buccal mucosa unchanged tender to palpation along the mandibular gumline but no lesions noted.  Eyes:      Extraocular Movements: Extraocular movements intact.      Pupils: Pupils are equal, round, and reactive to light.   Cardiovascular:      Rate and Rhythm: Normal rate and regular

## 2025-08-21 ENCOUNTER — OFFICE VISIT (OUTPATIENT)
Dept: ENT CLINIC | Age: 85
End: 2025-08-21
Payer: MEDICARE

## 2025-08-21 VITALS — SYSTOLIC BLOOD PRESSURE: 112 MMHG | BODY MASS INDEX: 30.04 KG/M2 | DIASTOLIC BLOOD PRESSURE: 68 MMHG | WEIGHT: 175 LBS

## 2025-08-21 DIAGNOSIS — K13.79 ORAL PAIN: Primary | ICD-10-CM

## 2025-08-21 PROCEDURE — 1123F ACP DISCUSS/DSCN MKR DOCD: CPT | Performed by: OTOLARYNGOLOGY

## 2025-08-21 PROCEDURE — 1160F RVW MEDS BY RX/DR IN RCRD: CPT | Performed by: OTOLARYNGOLOGY

## 2025-08-21 PROCEDURE — 1159F MED LIST DOCD IN RCRD: CPT | Performed by: OTOLARYNGOLOGY

## 2025-08-21 PROCEDURE — 99213 OFFICE O/P EST LOW 20 MIN: CPT | Performed by: OTOLARYNGOLOGY

## 2025-08-21 ASSESSMENT — ENCOUNTER SYMPTOMS
GASTROINTESTINAL NEGATIVE: 1
RESPIRATORY NEGATIVE: 1
ALLERGIC/IMMUNOLOGIC NEGATIVE: 1
EYES NEGATIVE: 1